# Patient Record
Sex: FEMALE | Race: OTHER | HISPANIC OR LATINO | URBAN - METROPOLITAN AREA
[De-identification: names, ages, dates, MRNs, and addresses within clinical notes are randomized per-mention and may not be internally consistent; named-entity substitution may affect disease eponyms.]

---

## 2019-02-09 ENCOUNTER — EMERGENCY (EMERGENCY)
Facility: HOSPITAL | Age: 24
LOS: 1 days | Discharge: HOME | End: 2019-02-09
Attending: EMERGENCY MEDICINE | Admitting: EMERGENCY MEDICINE
Payer: MEDICAID

## 2019-02-09 VITALS
RESPIRATION RATE: 15 BRPM | OXYGEN SATURATION: 100 % | TEMPERATURE: 98 F | SYSTOLIC BLOOD PRESSURE: 106 MMHG | DIASTOLIC BLOOD PRESSURE: 59 MMHG | HEART RATE: 55 BPM

## 2019-02-09 VITALS
RESPIRATION RATE: 16 BRPM | DIASTOLIC BLOOD PRESSURE: 59 MMHG | OXYGEN SATURATION: 100 % | TEMPERATURE: 98 F | SYSTOLIC BLOOD PRESSURE: 110 MMHG | HEART RATE: 66 BPM

## 2019-02-09 PROCEDURE — 99284 EMERGENCY DEPT VISIT MOD MDM: CPT

## 2019-02-09 PROCEDURE — 70450 CT HEAD/BRAIN W/O DYE: CPT | Mod: 26

## 2019-02-09 RX ORDER — ACETAMINOPHEN 500 MG
975 TABLET ORAL ONCE
Qty: 0 | Refills: 0 | Status: COMPLETED | OUTPATIENT
Start: 2019-02-09 | End: 2019-02-09

## 2019-02-09 RX ORDER — METOCLOPRAMIDE HCL 10 MG
10 TABLET ORAL ONCE
Qty: 0 | Refills: 0 | Status: COMPLETED | OUTPATIENT
Start: 2019-02-09 | End: 2019-02-09

## 2019-02-09 RX ORDER — IBUPROFEN 200 MG
400 TABLET ORAL ONCE
Qty: 0 | Refills: 0 | Status: COMPLETED | OUTPATIENT
Start: 2019-02-09 | End: 2019-02-09

## 2019-02-09 RX ORDER — PROCHLORPERAZINE MALEATE 5 MG
1 TABLET ORAL
Qty: 21 | Refills: 0
Start: 2019-02-09 | End: 2019-02-15

## 2019-02-09 RX ORDER — PROCHLORPERAZINE MALEATE 5 MG
1 TABLET ORAL
Qty: 21 | Refills: 0 | OUTPATIENT
Start: 2019-02-09 | End: 2019-02-15

## 2019-02-09 RX ADMIN — Medication 400 MILLIGRAM(S): at 14:14

## 2019-02-09 RX ADMIN — Medication 975 MILLIGRAM(S): at 12:32

## 2019-02-09 RX ADMIN — Medication 10 MILLIGRAM(S): at 12:32

## 2019-02-09 NOTE — ED ADULT TRIAGE NOTE - CHIEF COMPLAINT QUOTE
Headache with loss of balance x 2 weeks.  Sent for further evaluation by PMD who prescribed Butalbit-Acetaminophen, caffeine w/o relief

## 2019-02-09 NOTE — ED PROVIDER NOTE - NSFOLLOWUPCLINICS_GEN_ALL_ED_FT
[facility]  Neurology  [address] [place] [address] [address]rd [place] [address]  Phone: [phone]  Fax:   Follow Up Time:

## 2019-02-09 NOTE — ED PROVIDER NOTE - PROGRESS NOTE DETAILS
Pt has normal gait, tolerating po, well appearing, will dc to follow up with neuro. Precautions reviewed.

## 2019-02-09 NOTE — ED PROVIDER NOTE - MEDICAL DECISION MAKING DETAILS
24 y/o F with no significant PMHx presents to ED complaining of nausea and headache since 3 weeks ago. Plan: provide medications and obtain CT head.

## 2019-02-09 NOTE — ED PROVIDER NOTE - NEUROLOGICAL, MLM
Verified Results  PAP SMEAR, THIN PREP [id number] 01:00PM LASHONDA BAE     Test Name Result Flag Reference   GYN PAP SMEAR, THIN PREP (Report) O    Name: STORM JEONG      MRN:   [id number]   : 1971           Visit#: [id number]                Gynecologic Cytology Consultation Report      Client:  [facility]      Date Specimen Collected: 17      Accession #: [id number]   Date Specimen Received: 17      Requisition   #:[id number]   Date Reported:      2017 10:31  Location:   [facility]      ______________________________________________________________________________   Cytologic Interpretation :      Negative for intraepithelial lesion or malignancy.       Satisfactory for evaluation. Presence of endocervical/transformation zone   component.         CRYSTAL Helton(ASCP)   ** Electronic Signature (LSY) 2017  10:31 **      Educational note: The Pap test is a screening test with a well-recognized   false negative rate. The best means available to lower the false negative   rate and to detect early cervical lesions is a Pap test at regular intervals.    All ThinPrep Paps will be reviewed with the aid of the ThinPrep Imaging   System, unless otherwise specified.      ______________________________________________________________________________   Clinical Information:   Menstrual Hx: No Menstrual History Provided   Other Clinical Conditions:Pap source: Endocervical   Specimen(s) Submitted:    Thin Prep Pap Test      ICD Codes:    Z12.4      Fee Codes:    A: [id number]      Performing Lab Location (Unless otherwise specified):   [address] [address]        Alert and oriented, no focal deficits, no motor or sensory deficits.

## 2019-02-09 NOTE — ED PROVIDER NOTE - OBJECTIVE STATEMENT
24 y/o F with no significant PMHx presents to ED complaining of nausea and headache since 3 weeks ago. Pt states the headache initially began 1 year ago. Pt was seen by her PMD and prescribed medications which have not resolved the headache. The doctor stated this may be tension headaches. Pt states she has no chance of being pregnant. Pt states she takes Advil with no relief. Pt denies any stress, vomiting, diarrhea, fever, chills or any other medical problems.

## 2019-09-30 ENCOUNTER — EMERGENCY (EMERGENCY)
Facility: HOSPITAL | Age: 24
LOS: 1 days | Discharge: HOME | End: 2019-09-30
Attending: INTERNAL MEDICINE | Admitting: INTERNAL MEDICINE
Payer: MEDICAID

## 2019-09-30 VITALS
DIASTOLIC BLOOD PRESSURE: 62 MMHG | TEMPERATURE: 99 F | OXYGEN SATURATION: 100 % | HEART RATE: 67 BPM | SYSTOLIC BLOOD PRESSURE: 104 MMHG | RESPIRATION RATE: 16 BRPM

## 2019-09-30 LAB
ALBUMIN SERPL ELPH-MCNC: 4.5 G/DL — SIGNIFICANT CHANGE UP (ref 3.3–5)
ALP SERPL-CCNC: 51 U/L — SIGNIFICANT CHANGE UP (ref 40–120)
ALT FLD-CCNC: 8 U/L — SIGNIFICANT CHANGE UP (ref 4–33)
ANION GAP SERPL CALC-SCNC: 8 MMO/L — SIGNIFICANT CHANGE UP (ref 7–14)
APPEARANCE UR: CLEAR — SIGNIFICANT CHANGE UP
APTT BLD: 32.3 SEC — SIGNIFICANT CHANGE UP (ref 27.5–36.3)
AST SERPL-CCNC: 10 U/L — SIGNIFICANT CHANGE UP (ref 4–32)
BACTERIA # UR AUTO: NEGATIVE — SIGNIFICANT CHANGE UP
BASE EXCESS BLDV CALC-SCNC: 0.2 MMOL/L — SIGNIFICANT CHANGE UP
BASOPHILS # BLD AUTO: 0.05 K/UL — SIGNIFICANT CHANGE UP (ref 0–0.2)
BASOPHILS NFR BLD AUTO: 0.5 % — SIGNIFICANT CHANGE UP (ref 0–2)
BILIRUB SERPL-MCNC: 0.3 MG/DL — SIGNIFICANT CHANGE UP (ref 0.2–1.2)
BILIRUB UR-MCNC: NEGATIVE — SIGNIFICANT CHANGE UP
BLOOD GAS VENOUS - CREATININE: 0.66 MG/DL — SIGNIFICANT CHANGE UP (ref 0.5–1.3)
BLOOD UR QL VISUAL: NEGATIVE — SIGNIFICANT CHANGE UP
BUN SERPL-MCNC: 11 MG/DL — SIGNIFICANT CHANGE UP (ref 7–23)
CALCIUM SERPL-MCNC: 9.3 MG/DL — SIGNIFICANT CHANGE UP (ref 8.4–10.5)
CHLORIDE BLDV-SCNC: 107 MMOL/L — SIGNIFICANT CHANGE UP (ref 96–108)
CHLORIDE SERPL-SCNC: 104 MMOL/L — SIGNIFICANT CHANGE UP (ref 98–107)
CO2 SERPL-SCNC: 25 MMOL/L — SIGNIFICANT CHANGE UP (ref 22–31)
COLOR SPEC: COLORLESS — SIGNIFICANT CHANGE UP
CREAT SERPL-MCNC: 0.67 MG/DL — SIGNIFICANT CHANGE UP (ref 0.5–1.3)
EOSINOPHIL # BLD AUTO: 0.33 K/UL — SIGNIFICANT CHANGE UP (ref 0–0.5)
EOSINOPHIL NFR BLD AUTO: 3.5 % — SIGNIFICANT CHANGE UP (ref 0–6)
GAS PNL BLDV: 137 MMOL/L — SIGNIFICANT CHANGE UP (ref 136–146)
GLUCOSE BLDV-MCNC: 97 MG/DL — SIGNIFICANT CHANGE UP (ref 70–99)
GLUCOSE SERPL-MCNC: 96 MG/DL — SIGNIFICANT CHANGE UP (ref 70–99)
GLUCOSE UR-MCNC: NEGATIVE — SIGNIFICANT CHANGE UP
HCG UR-SCNC: NEGATIVE — SIGNIFICANT CHANGE UP
HCO3 BLDV-SCNC: 22 MMOL/L — SIGNIFICANT CHANGE UP (ref 20–27)
HCT VFR BLD CALC: 40.4 % — SIGNIFICANT CHANGE UP (ref 34.5–45)
HCT VFR BLDV CALC: 41.5 % — SIGNIFICANT CHANGE UP (ref 34.5–45)
HGB BLD-MCNC: 13.4 G/DL — SIGNIFICANT CHANGE UP (ref 11.5–15.5)
HGB BLDV-MCNC: 13.5 G/DL — SIGNIFICANT CHANGE UP (ref 11.5–15.5)
HYALINE CASTS # UR AUTO: NEGATIVE — SIGNIFICANT CHANGE UP
IMM GRANULOCYTES NFR BLD AUTO: 0.3 % — SIGNIFICANT CHANGE UP (ref 0–1.5)
INR BLD: 1.16 — SIGNIFICANT CHANGE UP (ref 0.88–1.17)
KETONES UR-MCNC: NEGATIVE — SIGNIFICANT CHANGE UP
LACTATE BLDV-MCNC: 1.8 MMOL/L — SIGNIFICANT CHANGE UP (ref 0.5–2)
LEUKOCYTE ESTERASE UR-ACNC: SIGNIFICANT CHANGE UP
LIDOCAIN IGE QN: 52.8 U/L — SIGNIFICANT CHANGE UP (ref 7–60)
LYMPHOCYTES # BLD AUTO: 1.76 K/UL — SIGNIFICANT CHANGE UP (ref 1–3.3)
LYMPHOCYTES # BLD AUTO: 18.9 % — SIGNIFICANT CHANGE UP (ref 13–44)
MCHC RBC-ENTMCNC: 31.4 PG — SIGNIFICANT CHANGE UP (ref 27–34)
MCHC RBC-ENTMCNC: 33.2 % — SIGNIFICANT CHANGE UP (ref 32–36)
MCV RBC AUTO: 94.6 FL — SIGNIFICANT CHANGE UP (ref 80–100)
MONOCYTES # BLD AUTO: 0.89 K/UL — SIGNIFICANT CHANGE UP (ref 0–0.9)
MONOCYTES NFR BLD AUTO: 9.6 % — SIGNIFICANT CHANGE UP (ref 2–14)
NEUTROPHILS # BLD AUTO: 6.24 K/UL — SIGNIFICANT CHANGE UP (ref 1.8–7.4)
NEUTROPHILS NFR BLD AUTO: 67.2 % — SIGNIFICANT CHANGE UP (ref 43–77)
NITRITE UR-MCNC: NEGATIVE — SIGNIFICANT CHANGE UP
NRBC # FLD: 0 K/UL — SIGNIFICANT CHANGE UP (ref 0–0)
PCO2 BLDV: 53 MMHG — HIGH (ref 41–51)
PH BLDV: 7.31 PH — LOW (ref 7.32–7.43)
PH UR: 7 — SIGNIFICANT CHANGE UP (ref 5–8)
PLATELET # BLD AUTO: 339 K/UL — SIGNIFICANT CHANGE UP (ref 150–400)
PMV BLD: 8.7 FL — SIGNIFICANT CHANGE UP (ref 7–13)
PO2 BLDV: < 24 MMHG — LOW (ref 35–40)
POTASSIUM BLDV-SCNC: 4.6 MMOL/L — HIGH (ref 3.4–4.5)
POTASSIUM SERPL-MCNC: 4.6 MMOL/L — SIGNIFICANT CHANGE UP (ref 3.5–5.3)
POTASSIUM SERPL-SCNC: 4.6 MMOL/L — SIGNIFICANT CHANGE UP (ref 3.5–5.3)
PROT SERPL-MCNC: 7.2 G/DL — SIGNIFICANT CHANGE UP (ref 6–8.3)
PROT UR-MCNC: NEGATIVE — SIGNIFICANT CHANGE UP
PROTHROM AB SERPL-ACNC: 12.9 SEC — SIGNIFICANT CHANGE UP (ref 9.8–13.1)
RBC # BLD: 4.27 M/UL — SIGNIFICANT CHANGE UP (ref 3.8–5.2)
RBC # FLD: 13.1 % — SIGNIFICANT CHANGE UP (ref 10.3–14.5)
RBC CASTS # UR COMP ASSIST: SIGNIFICANT CHANGE UP (ref 0–?)
SAO2 % BLDV: 16.1 % — LOW (ref 60–85)
SODIUM SERPL-SCNC: 137 MMOL/L — SIGNIFICANT CHANGE UP (ref 135–145)
SP GR SPEC: 1.01 — SIGNIFICANT CHANGE UP (ref 1–1.04)
SQUAMOUS # UR AUTO: SIGNIFICANT CHANGE UP
UROBILINOGEN FLD QL: NORMAL — SIGNIFICANT CHANGE UP
WBC # BLD: 9.3 K/UL — SIGNIFICANT CHANGE UP (ref 3.8–10.5)
WBC # FLD AUTO: 9.3 K/UL — SIGNIFICANT CHANGE UP (ref 3.8–10.5)
WBC UR QL: HIGH (ref 0–?)

## 2019-09-30 PROCEDURE — 99283 EMERGENCY DEPT VISIT LOW MDM: CPT

## 2019-09-30 RX ORDER — FAMOTIDINE 10 MG/ML
20 INJECTION INTRAVENOUS DAILY
Refills: 0 | Status: DISCONTINUED | OUTPATIENT
Start: 2019-09-30 | End: 2019-10-06

## 2019-09-30 RX ORDER — ACETAMINOPHEN 500 MG
650 TABLET ORAL ONCE
Refills: 0 | Status: COMPLETED | OUTPATIENT
Start: 2019-09-30 | End: 2019-09-30

## 2019-09-30 RX ADMIN — Medication 30 MILLILITER(S): at 17:47

## 2019-09-30 RX ADMIN — FAMOTIDINE 20 MILLIGRAM(S): 10 INJECTION INTRAVENOUS at 17:47

## 2019-09-30 RX ADMIN — Medication 650 MILLIGRAM(S): at 17:36

## 2019-09-30 NOTE — ED PROVIDER NOTE - PHYSICAL EXAMINATION
GEN: Seated in Stretcher, Awake, Alert, Conversant  HEENT: PERRL, normal sclera, moist oral mucosa  PULM: Breath sounds bilaterally, no increased work of breathing, speaking full sentences  CV: Normal and regular heart rate, normal S1/S2, no murmurs, rubs, or gallops  Abd: Soft and nontender, no rebound or guarding.  No lower quadrant or suprapubic tenderness, no CVAT.  No Rhomberg or Rovsing's sign present.  Ext: No lower extremity edema or erythema, nontender  Derm: No rashes  Neuro: Alert and oriented x3, clear and fluent speech, moving all extremities with good strength

## 2019-09-30 NOTE — ED PROVIDER NOTE - PATIENT PORTAL LINK FT
You can access the FollowMyHealth Patient Portal offered by [facility] by registering at the following website: [email or web address]. By joining [employer]’s FollowMyHealth portal, you will also be able to view your health information using other applications (apps) compatible with our system.

## 2019-09-30 NOTE — ED PROVIDER NOTE - NSFOLLOWUPINSTRUCTIONS_ED_ALL_ED_FT
Please follow up with your primary medical doctor within two days.  Return to the emergency department immediately if you develop worsening pain, nausea, vomiting or fever, or if you feel your condition to be worsening.

## 2019-09-30 NOTE — ED PROVIDER NOTE - PROGRESS NOTE DETAILS
Pain resolved S/P maalox.  Repeat abdominal exam benign.  Patient tolerates Po well.  Stable for discharge to outpatient care.  Return precautions discussed.

## 2019-09-30 NOTE — ED PROVIDER NOTE - CLINICAL SUMMARY MEDICAL DECISION MAKING FREE TEXT BOX
Dr. Desai:  23F denies PM P/W RUQ and epigastric pain.  Well appearing with benign exam.  Denies RLQ documented in triage.  No vaginal complaints.  Possible GERD.  Low suspicion for pancreatitis given benign exam.  Doubt cholecystitis given lack of nausea and fever.  Plan for labs, pepcid/maalox, re-eval.

## 2020-01-21 ENCOUNTER — EMERGENCY (EMERGENCY)
Facility: HOSPITAL | Age: 25
LOS: 1 days | Discharge: HOME | End: 2020-01-21
Attending: PERSONAL EMERGENCY RESPONSE ATTENDANT | Admitting: SPECIALIST
Payer: MEDICAID

## 2020-01-21 VITALS
TEMPERATURE: 98 F | HEART RATE: 60 BPM | RESPIRATION RATE: 17 BRPM | DIASTOLIC BLOOD PRESSURE: 41 MMHG | OXYGEN SATURATION: 100 % | SYSTOLIC BLOOD PRESSURE: 104 MMHG

## 2020-01-21 LAB
ALBUMIN SERPL ELPH-MCNC: 4.8 G/DL — SIGNIFICANT CHANGE UP (ref 3.3–5)
ALP SERPL-CCNC: 48 U/L — SIGNIFICANT CHANGE UP (ref 40–120)
ALT FLD-CCNC: 10 U/L — SIGNIFICANT CHANGE UP (ref 4–33)
ANION GAP SERPL CALC-SCNC: 11 MMO/L — SIGNIFICANT CHANGE UP (ref 7–14)
APPEARANCE UR: CLEAR — SIGNIFICANT CHANGE UP
AST SERPL-CCNC: 12 U/L — SIGNIFICANT CHANGE UP (ref 4–32)
BACTERIA # UR AUTO: SIGNIFICANT CHANGE UP
BASOPHILS # BLD AUTO: 0.05 K/UL — SIGNIFICANT CHANGE UP (ref 0–0.2)
BASOPHILS NFR BLD AUTO: 0.6 % — SIGNIFICANT CHANGE UP (ref 0–2)
BILIRUB SERPL-MCNC: 0.2 MG/DL — SIGNIFICANT CHANGE UP (ref 0.2–1.2)
BILIRUB UR-MCNC: NEGATIVE — SIGNIFICANT CHANGE UP
BLOOD UR QL VISUAL: NEGATIVE — SIGNIFICANT CHANGE UP
BUN SERPL-MCNC: 9 MG/DL — SIGNIFICANT CHANGE UP (ref 7–23)
CALCIUM SERPL-MCNC: 9.5 MG/DL — SIGNIFICANT CHANGE UP (ref 8.4–10.5)
CHLORIDE SERPL-SCNC: 104 MMOL/L — SIGNIFICANT CHANGE UP (ref 98–107)
CO2 SERPL-SCNC: 23 MMOL/L — SIGNIFICANT CHANGE UP (ref 22–31)
COLOR SPEC: COLORLESS — SIGNIFICANT CHANGE UP
CREAT SERPL-MCNC: 0.64 MG/DL — SIGNIFICANT CHANGE UP (ref 0.5–1.3)
EOSINOPHIL # BLD AUTO: 0.32 K/UL — SIGNIFICANT CHANGE UP (ref 0–0.5)
EOSINOPHIL NFR BLD AUTO: 3.7 % — SIGNIFICANT CHANGE UP (ref 0–6)
GLUCOSE SERPL-MCNC: 93 MG/DL — SIGNIFICANT CHANGE UP (ref 70–99)
GLUCOSE UR-MCNC: NEGATIVE — SIGNIFICANT CHANGE UP
HCT VFR BLD CALC: 40 % — SIGNIFICANT CHANGE UP (ref 34.5–45)
HGB BLD-MCNC: 13.5 G/DL — SIGNIFICANT CHANGE UP (ref 11.5–15.5)
HYALINE CASTS # UR AUTO: NEGATIVE — SIGNIFICANT CHANGE UP
IMM GRANULOCYTES NFR BLD AUTO: 0.3 % — SIGNIFICANT CHANGE UP (ref 0–1.5)
KETONES UR-MCNC: NEGATIVE — SIGNIFICANT CHANGE UP
LEUKOCYTE ESTERASE UR-ACNC: SIGNIFICANT CHANGE UP
LYMPHOCYTES # BLD AUTO: 1.63 K/UL — SIGNIFICANT CHANGE UP (ref 1–3.3)
LYMPHOCYTES # BLD AUTO: 18.7 % — SIGNIFICANT CHANGE UP (ref 13–44)
MCHC RBC-ENTMCNC: 31.5 PG — SIGNIFICANT CHANGE UP (ref 27–34)
MCHC RBC-ENTMCNC: 33.8 % — SIGNIFICANT CHANGE UP (ref 32–36)
MCV RBC AUTO: 93.2 FL — SIGNIFICANT CHANGE UP (ref 80–100)
MONOCYTES # BLD AUTO: 0.71 K/UL — SIGNIFICANT CHANGE UP (ref 0–0.9)
MONOCYTES NFR BLD AUTO: 8.1 % — SIGNIFICANT CHANGE UP (ref 2–14)
NEUTROPHILS # BLD AUTO: 5.99 K/UL — SIGNIFICANT CHANGE UP (ref 1.8–7.4)
NEUTROPHILS NFR BLD AUTO: 68.6 % — SIGNIFICANT CHANGE UP (ref 43–77)
NITRITE UR-MCNC: NEGATIVE — SIGNIFICANT CHANGE UP
NRBC # FLD: 0 K/UL — SIGNIFICANT CHANGE UP (ref 0–0)
PH UR: 6 — SIGNIFICANT CHANGE UP (ref 5–8)
PLATELET # BLD AUTO: 368 K/UL — SIGNIFICANT CHANGE UP (ref 150–400)
PMV BLD: 8.9 FL — SIGNIFICANT CHANGE UP (ref 7–13)
POTASSIUM SERPL-MCNC: 4.2 MMOL/L — SIGNIFICANT CHANGE UP (ref 3.5–5.3)
POTASSIUM SERPL-SCNC: 4.2 MMOL/L — SIGNIFICANT CHANGE UP (ref 3.5–5.3)
PROT SERPL-MCNC: 7.5 G/DL — SIGNIFICANT CHANGE UP (ref 6–8.3)
PROT UR-MCNC: NEGATIVE — SIGNIFICANT CHANGE UP
RBC # BLD: 4.29 M/UL — SIGNIFICANT CHANGE UP (ref 3.8–5.2)
RBC # FLD: 12.9 % — SIGNIFICANT CHANGE UP (ref 10.3–14.5)
RBC CASTS # UR COMP ASSIST: SIGNIFICANT CHANGE UP (ref 0–?)
SODIUM SERPL-SCNC: 138 MMOL/L — SIGNIFICANT CHANGE UP (ref 135–145)
SP GR SPEC: 1.01 — SIGNIFICANT CHANGE UP (ref 1–1.04)
SQUAMOUS # UR AUTO: SIGNIFICANT CHANGE UP
UROBILINOGEN FLD QL: NORMAL — SIGNIFICANT CHANGE UP
WBC # BLD: 8.73 K/UL — SIGNIFICANT CHANGE UP (ref 3.8–10.5)
WBC # FLD AUTO: 8.73 K/UL — SIGNIFICANT CHANGE UP (ref 3.8–10.5)
WBC UR QL: HIGH (ref 0–?)

## 2020-01-21 PROCEDURE — 99285 EMERGENCY DEPT VISIT HI MDM: CPT

## 2020-01-21 RX ORDER — ACETAMINOPHEN 500 MG
650 TABLET ORAL ONCE
Refills: 0 | Status: COMPLETED | OUTPATIENT
Start: 2020-01-21 | End: 2020-01-21

## 2020-01-21 RX ORDER — SODIUM CHLORIDE 9 MG/ML
1000 INJECTION, SOLUTION INTRAVENOUS
Refills: 0 | Status: DISCONTINUED | OUTPATIENT
Start: 2020-01-21 | End: 2020-01-22

## 2020-01-21 RX ORDER — CEFOTETAN DISODIUM 1 G
2 VIAL (EA) INJECTION ONCE
Refills: 0 | Status: DISCONTINUED | OUTPATIENT
Start: 2020-01-21 | End: 2020-01-21

## 2020-01-21 RX ORDER — SODIUM CHLORIDE 9 MG/ML
1000 INJECTION INTRAMUSCULAR; INTRAVENOUS; SUBCUTANEOUS ONCE
Refills: 0 | Status: COMPLETED | OUTPATIENT
Start: 2020-01-21 | End: 2020-01-21

## 2020-01-21 RX ADMIN — SODIUM CHLORIDE 2000 MILLILITER(S): 9 INJECTION INTRAMUSCULAR; INTRAVENOUS; SUBCUTANEOUS at 17:52

## 2020-01-21 RX ADMIN — Medication 650 MILLIGRAM(S): at 18:21

## 2020-01-21 RX ADMIN — Medication 650 MILLIGRAM(S): at 17:51

## 2020-01-21 RX ADMIN — SODIUM CHLORIDE 1000 MILLILITER(S): 9 INJECTION INTRAMUSCULAR; INTRAVENOUS; SUBCUTANEOUS at 19:00

## 2020-01-21 NOTE — ED PROVIDER NOTE - ABDOMINAL EXAM
RLQ TTP without guarding, R lumbar triangle tender to percussion, medial R thigh ttp without palpable masses/cording or focus of ttp

## 2020-01-21 NOTE — ED ADULT NURSE NOTE - OBJECTIVE STATEMENT
Patient presents for RLQ pain worse after eating and tender on palpation x 5 days. Denies fevers/chills/dysuria/bleeding/discharge. MD evaluated plan is for CT abdomen to r/o appendicitis. PO contrast given. IV placed RT arm #20g. Denies med hx. LMP 1/5/2020

## 2020-01-21 NOTE — ED PROVIDER NOTE - GENITOURINARY BLADDER
Narrow introitus with discomfort on exam, bimanual performed,  R adnexal TTP on pelvic exam, no chandelier sign, no L adnexal ttp, no discharge noted on exam, no palpable masses

## 2020-01-21 NOTE — H&P ADULT - NSHPPHYSICALEXAM_GEN_ALL_CORE
General: A&Ox3, NAD.  Neuro: CN II-XII intact  HEENT: Normocephalic, atraumatic  Respiratory: unlabored breathing.   CVS: bradycardic  Abdomen: Soft, non-distended, mild tenderness in RLQ.  No rebound tenderness, no guarding.  Extremities: Warm bilaterally.  MSK: Intact ROM.

## 2020-01-21 NOTE — H&P ADULT - NSHPLABSRESULTS_GEN_ALL_CORE
CBC ( @ 17:50)                          13.5                     8.73    )--------------(  368        68.6  % Neuts, 18.7  % Lymphs, ANC: 5.99                            40.0      BMP ( @ 17:50)       138     |  104     |  9     			Ca++ --      Ca 9.5          ---------------------------------( 93    		Mg --           4.2     |  23      |  0.64  			Ph --        LFTs ( @ 17:50)      TPro 7.5 / Alb 4.8 / TBili 0.2 / DBili -- / AST 12 / ALT 10 / AlkPhos 48            Urinalysis ( @ 17:40):     Color: COLORLESS / Appearance: CLEAR / S.007 / pH: 6.0 / Gluc: NEGATIVE / Ketones: NEGATIVE / Bili: NEGATIVE / Urobili: NORMAL / Protein :NEGATIVE / Nitrites: NEGATIVE / Leuk.Est: LARGE / RBC: 0-2 / WBC: 26-50<H> / Sq Epi: OCC / Non Sq Epi:  / Bacteria FEW         IMAGING:  CT A/P:    IMPRESSION -      IMPRESSION:     Appendiceal tip is mildly thickened and does not fill with oral contrast material. Suggestion of trace periappendiceal inflammation. Findings concerning for early tip appendicitis.

## 2020-01-21 NOTE — H&P ADULT - ASSESSMENT
Patient is a 24 year old female with no PMH or PSH that has tip appendicitis.    -NPO  -IVFs for hydration  -Consent in front of chart  -OR for lap appy  -abx coverage with cefotetan  -Lovenox for VTE px    Lucian Puri PGY3  B Team Surgery #[id number] ASSESSMENT:  Patient is a 24 year old female with no PMH or PSH who presents with concern for possible tip appendicitis.    PLAN:  - Admit to B team surgery under Dr. Weeks  - Will monitor patient overnight off of antibiotics  - NPO  - IVF   - No standing pain meds  - May plan for lap appy if patient declines overnight    Lucian Puri PGY3  B Team Surgery #[id number]

## 2020-01-21 NOTE — ED ADULT NURSE REASSESSMENT NOTE - NS ED NURSE REASSESS COMMENT FT1
pt A&Ox4, admitted to surgery. labs sent. NPO status reenforced. patient on phone with parents discussing plan of care. 20G IV noted to right AC, flushes well with positive blood return.

## 2020-01-21 NOTE — H&P ADULT - ATTENDING COMMENTS
Above noted. The patient was seen in the ER yesterday. History obtained, the patient was examined. Four day history of abdominal pain, radiating to the back and down her right leg. She has chronic right inguinal/ abdominal pain with her menstrual cycles, never investigated.  When seen in the ER yesterday, she admitted the pain was less intense than on presentation.   CT Scan: Consistent with tip appendicitis.  She was admitted, kept NPO and monitored closely overnight.

## 2020-01-21 NOTE — ED PROVIDER NOTE - OBJECTIVE STATEMENT
Attending MD Mckeon.  Pt is an otherwise healthy 23 yo female with LMP 1/5 who presents to Ed with 3 days RLQ pain.  Pt states she had sharp significant pain 3 days ago and has had burning and pain in RLQ that now radiates to R lumbar triangle and R medial thigh since that time.  Denies fevers but endorses some chills.  Endorses vague nausea but not vomiting/diarrhea.  States she had something that may have been similar sev'l yrs ago but was evaluated for ovarian pathology at that time and had no findings.  She endorses sexual activity with intravaginal intercourse last 2 mos ago.  Pt denies vaginal discharge/bleeding during this pain.  No urinary sxs.  no hx of abdominal or pelvic surgeries.  Pt well appearing in NAD but has discomfort to RLQ when she moves or moves her RLE.

## 2020-01-21 NOTE — H&P ADULT - HISTORY OF PRESENT ILLNESS
Patient is a 24 year old female with no PMH or PSH that is presenting to the ED with 3 days of abdominal pain.  The pain started in the RLQ and has remained there.  It radiates to her right back.  She denies having any nausea, vomiting, fevers, chills, or burning with urination.  She has been having regular bowel movements and still has flatus.  Patient states that she had similar pain about three years ago, but was no diagnosis was determined.  Of note, patient also states that she notices a bulge in her right groin that comes and goes with standing and eating.  However, she currently has no bulge in right groin.

## 2020-01-21 NOTE — ED PROVIDER NOTE - CLINICAL SUMMARY MEDICAL DECISION MAKING FREE TEXT BOX
Attending MD Mckeon.  Pt is well appearing and has RLQ and R adnexal TTP.  Concern for ovarian cyst/rupture 2.2 initial sharp pain and now burning with R lumbar triangle and r medial thigh involvement poss c/w psoas irritation.  Will also eval for appy.  hemodynamically stable. afebrile.

## 2020-01-21 NOTE — ED ADULT NURSE REASSESSMENT NOTE - CONDITION
unchanged Ear Star Wedge Flap Text: The defect edges were debeveled with a #15 blade scalpel.  Given the location of the defect and the proximity to free margins (helical rim) an ear star wedge flap was deemed most appropriate.  Using a sterile surgical marker, the appropriate flap was drawn incorporating the defect and placing the expected incisions between the helical rim and antihelix where possible.  The area thus outlined was incised through and through with a #15 scalpel blade.

## 2020-01-21 NOTE — ED ADULT NURSE NOTE - NSFALLRSKASSESASSIST_ED_ALL_ED
Med: Hydrocodone  Dosage:  MG  Sig:    Quantity requested:  75    Med: Morphine SR  Dosage: 15 MG  Sig:    Quantity requested:  30    Mail Order: no    Preferred pharmacy has been set up and verified.  [place] [phone]   no

## 2020-01-21 NOTE — ED ADULT NURSE REASSESSMENT NOTE - NS ED NURSE REASSESS COMMENT FT1
pt returned from CT, endorses RLQ abdominal pain. states 3/10 is comfortable. awaiting results and disposition, advised to remain NPO.

## 2020-01-22 VITALS
RESPIRATION RATE: 17 BRPM | OXYGEN SATURATION: 100 % | SYSTOLIC BLOOD PRESSURE: 104 MMHG | HEART RATE: 63 BPM | DIASTOLIC BLOOD PRESSURE: 56 MMHG | TEMPERATURE: 97 F

## 2020-01-22 DIAGNOSIS — R10.31 RIGHT LOWER QUADRANT PAIN: ICD-10-CM

## 2020-01-22 LAB
APTT BLD: 31.6 SEC — SIGNIFICANT CHANGE UP (ref 27.5–36.3)
BLD GP AB SCN SERPL QL: NEGATIVE — SIGNIFICANT CHANGE UP
INR BLD: 1.11 — SIGNIFICANT CHANGE UP (ref 0.88–1.17)
PROTHROM AB SERPL-ACNC: 12.4 SEC — SIGNIFICANT CHANGE UP (ref 9.8–13.1)
RH IG SCN BLD-IMP: POSITIVE — SIGNIFICANT CHANGE UP

## 2020-01-22 RX ORDER — ENOXAPARIN SODIUM 100 MG/ML
40 INJECTION SUBCUTANEOUS DAILY
Refills: 0 | Status: DISCONTINUED | OUTPATIENT
Start: 2020-01-22 | End: 2020-01-22

## 2020-01-22 RX ORDER — INFLUENZA VIRUS VACCINE 15; 15; 15; 15 UG/.5ML; UG/.5ML; UG/.5ML; UG/.5ML
0.5 SUSPENSION INTRAMUSCULAR ONCE
Refills: 0 | Status: DISCONTINUED | OUTPATIENT
Start: 2020-01-22 | End: 2020-01-22

## 2020-01-22 RX ADMIN — SODIUM CHLORIDE 100 MILLILITER(S): 9 INJECTION, SOLUTION INTRAVENOUS at 00:36

## 2020-01-22 RX ADMIN — SODIUM CHLORIDE 50 MILLILITER(S): 9 INJECTION, SOLUTION INTRAVENOUS at 15:00

## 2020-01-22 NOTE — CHART NOTE - NSCHARTNOTEFT_GEN_A_CORE
CAPRINI SCORE [CLOT]    AGE RELATED RISK FACTORS                                                       MOBILITY RELATED FACTORS  [ ] Age 41-60 years                                            (1 Point)                  [ ] Bed rest                                                        (1 Point)  [ ] Age: 61-74 years                                           (2 Points)                 [ ] Plaster cast                                                   (2 Points)  [ ] Age= 75 years                                              (3 Points)                 [ ] Bed bound for more than 72 hours                 (2 Points)    DISEASE RELATED RISK FACTORS                                               GENDER SPECIFIC FACTORS  [ ] Edema in the lower extremities                       (1 Point)                  [ ] Pregnancy                                                     (1 Point)  [ ] Varicose veins                                               (1 Point)                  [ ] Post-partum < 6 weeks                                   (1 Point)             [ ] BMI > 25 Kg/m2                                            (1 Point)                  [ ] Hormonal therapy  or oral contraception          (1 Point)                 [ ] Sepsis (in the previous month)                        (1 Point)                  [ ] History of pregnancy complications                 (1 point)  [ ] Pneumonia or serious lung disease                                               [ ] Unexplained or recurrent                     (1 Point)           (in the previous month)                               (1 Point)  [ ] Abnormal pulmonary function test                     (1 Point)                 SURGERY RELATED RISK FACTORS  [ ] Acute myocardial infarction                              (1 Point)                 [ ]  Section                                             (1 Point)  [ ] Congestive heart failure (in the previous month)  (1 Point)               [ ] Minor surgery                                                  (1 Point)   [ ] Inflammatory bowel disease                             (1 Point)                 [ ] Arthroscopic surgery                                        (2 Points)  [ ] Central venous access                                      (2 Points)                [ ] General surgery lasting more than 45 minutes   (2 Points)       [ ] Stroke (in the previous month)                          (5 Points)               [ ] Elective arthroplasty                                         (5 Points)                                                                                                                                               HEMATOLOGY RELATED FACTORS                                                 TRAUMA RELATED RISK FACTORS  [ ] Prior episodes of VTE                                     (3 Points)                [ ] Fracture of the hip, pelvis, or leg                       (5 Points)  [ ] Positive family history for VTE                         (3 Points)                 [ ] Acute spinal cord injury (in the previous month)  (5 Points)  [ ] Prothrombin 80364 A                                     (3 Points)                 [ ] Paralysis  (less than 1 month)                             (5 Points)  [ ] Factor V Leiden                                             (3 Points)                  [ ] Multiple Trauma within 1 month                        (5 Points)  [ ] Lupus anticoagulants                                     (3 Points)                                                           [ ] Anticardiolipin antibodies                               (3 Points)                                                       [ ] High homocysteine in the blood                      (3 Points)                                             [ ] Other congenital or acquired thrombophilia      (3 Points)                                                [ ] Heparin induced thrombocytopenia                  (3 Points)                                          Total Score [    0    ]    Caprini Score 0 - 2:  Low Risk, No VTE Prophylaxis required for most patients, encourage ambulation  Caprini Score 3 - 6:  At Risk, pharmacologic VTE prophylaxis is indicated for most patients (in the absence of a contraindication)  Caprini Score Greater than or = 7:  High Risk, pharmacologic VTE prophylaxis is indicated for most patients (in the absence of a contraindication)

## 2020-01-22 NOTE — DISCHARGE NOTE NURSING/CASE MANAGEMENT/SOCIAL WORK - PATIENT PORTAL LINK FT
You can access the FollowMyHealth Patient Portal offered by [facility] by registering at the following website: [email or web address]. By joining [employer]’s FollowMyHealth portal, you will also be able to view your health information using other applications (apps) compatible with our system.

## 2020-01-22 NOTE — PROGRESS NOTE ADULT - ATTENDING COMMENTS
Above noted. Denies nausea or pain, tolerating oral intake.   Physical Exam: Abdomen: Soft, flat, non-tender.  Labs: WBC is normal.  Plan: Discharge with instructions to return to the ER if pain recurs. Also recommended she should have a GYN consultation.

## 2020-01-22 NOTE — DISCHARGE NOTE PROVIDER - NSDCCPCAREPLAN_GEN_ALL_CORE_FT
PRINCIPAL DISCHARGE DIAGNOSIS  Diagnosis: Right lower quadrant abdominal pain  Assessment and Plan of Treatment: WOUND CARE:   BATHING: Please do not submerge wound underwater. You may shower and/or sponge bathe.  ACTIVITY: Resume activities of daily living.  DIET: Return to your usual diet.  NOTIFY YOUR SURGEON IF: You have any bleeding that does not stop,  any fever (over 100.4 F) or chills, persistent nausea/vomiting, persistent diarrhea, or if your pain is not controlled on your discharge pain medications.  FOLLOW-UP:  1. Please call to make a follow-up appointment within two weeks of discharge with Dr. Weeks  2. Please follow up with your primary care physician in one week regarding your hospitalization.

## 2020-01-22 NOTE — PROGRESS NOTE ADULT - SUBJECTIVE AND OBJECTIVE BOX
B TEAM SURGERY PROGRESS NOTE    SUBJECTIVE: Patient seen and examined at bedside on AM rounds, complaining of right pelvic/lower abdomen pain. Pain improving, passing flauts without bowel movement today. Denies nausea/vomiting. Denies chest pain/SOB.     Vital Signs Last 24 Hrs  T(C): 36.7 (2020 07:48), Max: 36.8 (2020 20:31)  T(F): 98.1 (2020 07:48), Max: 98.3 (2020 20:31)  HR: 60 (2020 07:48) (54 - 63)  BP: 101/63 (2020 07:48) (89/45 - 104/41)  BP(mean): --  RR: 16 (2020 07:48) (16 - 18)  SpO2: 99% (2020 07:48) (98% - 100%)  I&O's Detail    MEDICATIONS  (STANDING):  enoxaparin Injectable 40 milliGRAM(s) SubCutaneous daily  influenza   Vaccine 0.5 milliLiter(s) IntraMuscular once  lactated ringers. 1000 milliLiter(s) (100 mL/Hr) IV Continuous <Continuous>    MEDICATIONS  (PRN):    Physical Exam  General: A&Ox3, NAD  Respiratory: Clear bilaterally, equal bilateral expansion  Cardiovascular: Regular rate & rhythm  Abdominal: Right pelvic pain, no rebound or guarding, soft, non-distended    LABS:                        13.5   8.73  )-----------( 368      ( 2020 17:50 )             40.0     01-    138  |  104  |  9   ----------------------------<  93  4.2   |  23  |  0.64    Ca    9.5      2020 17:50    TPro  7.5  /  Alb  4.8  /  TBili  0.2  /  DBili  x   /  AST  12  /  ALT  10  /  AlkPhos  48  01-21    PT/INR - ( 2020 22:50 )   PT: 12.4 SEC;   INR: 1.11          PTT - ( 2020 22:50 )  PTT:31.6 SEC  Urinalysis Basic - ( 2020 17:40 )    Color: COLORLESS / Appearance: CLEAR / S.007 / pH: 6.0  Gluc: NEGATIVE / Ketone: NEGATIVE  / Bili: NEGATIVE / Urobili: NORMAL   Blood: NEGATIVE / Protein: NEGATIVE / Nitrite: NEGATIVE   Leuk Esterase: LARGE / RBC: 0-2 / WBC 26-50   Sq Epi: OCC / Non Sq Epi: x / Bacteria: FEW      ABO Interpretation: SHI (20 @ 22:47)

## 2020-01-22 NOTE — PROGRESS NOTE ADULT - ASSESSMENT
Patient is a 24 year old female with no PMH or PSH who presents with concern for possible tip appendicitis, symptoms improving  - NPO  - LR @ 100ml/hr  - OOB/IS  - Lovenox for VTE ppx    To be discussed with Dr. Micky ELY Team Surgery #[id number]

## 2020-01-22 NOTE — DISCHARGE NOTE PROVIDER - HOSPITAL COURSE
Patient is a 24 year old female with no PMH or PSH that is presenting to the ED with 3 days of abdominal pain.  The pain started in the RLQ and has remained there.  It radiates to her right back.  She denies having any nausea, vomiting, fevers, chills, or burning with urination.  She has been having regular bowel movements and still has flatus.  Patient states that she had similar pain about three years ago, but was no diagnosis was determined.  Of note, patient also states that she notices a bulge in her right groin that comes and goes with standing and eating.  However, she currently has no bulge in right groin. CT abdomen/pelvis demonstrated Appendiceal tip is mildly thickened and does not fill with oral contrast material. Suggestion of trace periappendiceal inflammation. Findings concerning for early tip appendicitis. She was monitored with serial abdominal exams. The patient's pain was controlled by IV pain medications and then by PO pain medications. The patient was advanced to a regular diet and tolerated it well. The patient was placed on home medications. At the time of discharge, the patient was hemodynamically stable, was tolerating PO diet, was voiding urine and passing stool, was ambulating, and was comfortable with adequate pain control. The patient was instructed to follow up with Dr. Weeks within 2 weeks after discharge from the hospital. The patient & family felt comfortable with discharge. The patient had no other issues.

## 2020-01-23 LAB
BACTERIA UR CULT: SIGNIFICANT CHANGE UP
SPECIMEN SOURCE: SIGNIFICANT CHANGE UP

## 2020-12-14 ENCOUNTER — EMERGENCY (EMERGENCY)
Facility: HOSPITAL | Age: 25
LOS: 1 days | Discharge: HOME | End: 2020-12-14
Attending: EMERGENCY MEDICINE | Admitting: EMERGENCY MEDICINE
Payer: MEDICAID

## 2020-12-14 VITALS
SYSTOLIC BLOOD PRESSURE: 114 MMHG | HEART RATE: 72 BPM | OXYGEN SATURATION: 100 % | TEMPERATURE: 99 F | RESPIRATION RATE: 18 BRPM | HEIGHT: 61 IN | DIASTOLIC BLOOD PRESSURE: 71 MMHG

## 2020-12-14 LAB
ALBUMIN SERPL ELPH-MCNC: 4.7 G/DL — SIGNIFICANT CHANGE UP (ref 3.3–5)
ALP SERPL-CCNC: 53 U/L — SIGNIFICANT CHANGE UP (ref 40–120)
ALT FLD-CCNC: 10 U/L — SIGNIFICANT CHANGE UP (ref 4–33)
ANION GAP SERPL CALC-SCNC: 13 MMOL/L — SIGNIFICANT CHANGE UP (ref 7–14)
APPEARANCE UR: CLEAR — SIGNIFICANT CHANGE UP
APTT BLD: 33.1 SEC — SIGNIFICANT CHANGE UP (ref 27–36.3)
AST SERPL-CCNC: 12 U/L — SIGNIFICANT CHANGE UP (ref 4–32)
BACTERIA # UR AUTO: NEGATIVE — SIGNIFICANT CHANGE UP
BASOPHILS # BLD AUTO: 0.03 K/UL — SIGNIFICANT CHANGE UP (ref 0–0.2)
BASOPHILS NFR BLD AUTO: 0.4 % — SIGNIFICANT CHANGE UP (ref 0–2)
BILIRUB SERPL-MCNC: 0.2 MG/DL — SIGNIFICANT CHANGE UP (ref 0.2–1.2)
BILIRUB UR-MCNC: NEGATIVE — SIGNIFICANT CHANGE UP
BLD GP AB SCN SERPL QL: NEGATIVE — SIGNIFICANT CHANGE UP
BUN SERPL-MCNC: 13 MG/DL — SIGNIFICANT CHANGE UP (ref 7–23)
CALCIUM SERPL-MCNC: 9.3 MG/DL — SIGNIFICANT CHANGE UP (ref 8.4–10.5)
CHLORIDE SERPL-SCNC: 105 MMOL/L — SIGNIFICANT CHANGE UP (ref 98–107)
CO2 SERPL-SCNC: 21 MMOL/L — LOW (ref 22–31)
COLOR SPEC: SIGNIFICANT CHANGE UP
CREAT SERPL-MCNC: 0.63 MG/DL — SIGNIFICANT CHANGE UP (ref 0.5–1.3)
DIFF PNL FLD: NEGATIVE — SIGNIFICANT CHANGE UP
EOSINOPHIL # BLD AUTO: 0.18 K/UL — SIGNIFICANT CHANGE UP (ref 0–0.5)
EOSINOPHIL NFR BLD AUTO: 2.5 % — SIGNIFICANT CHANGE UP (ref 0–6)
EPI CELLS # UR: 4 /HPF — SIGNIFICANT CHANGE UP (ref 0–5)
GLUCOSE SERPL-MCNC: 94 MG/DL — SIGNIFICANT CHANGE UP (ref 70–99)
GLUCOSE UR QL: NEGATIVE — SIGNIFICANT CHANGE UP
HCT VFR BLD CALC: 39.1 % — SIGNIFICANT CHANGE UP (ref 34.5–45)
HGB BLD-MCNC: 13.3 G/DL — SIGNIFICANT CHANGE UP (ref 11.5–15.5)
HYALINE CASTS # UR AUTO: 1 /LPF — SIGNIFICANT CHANGE UP (ref 0–7)
IANC: 4.54 K/UL — SIGNIFICANT CHANGE UP (ref 1.5–8.5)
IMM GRANULOCYTES NFR BLD AUTO: 0.4 % — SIGNIFICANT CHANGE UP (ref 0–1.5)
INR BLD: 1.18 RATIO — HIGH (ref 0.88–1.17)
KETONES UR-MCNC: ABNORMAL
LEUKOCYTE ESTERASE UR-ACNC: ABNORMAL
LIDOCAIN IGE QN: 44 U/L — SIGNIFICANT CHANGE UP (ref 7–60)
LYMPHOCYTES # BLD AUTO: 2.05 K/UL — SIGNIFICANT CHANGE UP (ref 1–3.3)
LYMPHOCYTES # BLD AUTO: 28 % — SIGNIFICANT CHANGE UP (ref 13–44)
MCHC RBC-ENTMCNC: 31.6 PG — SIGNIFICANT CHANGE UP (ref 27–34)
MCHC RBC-ENTMCNC: 34 GM/DL — SIGNIFICANT CHANGE UP (ref 32–36)
MCV RBC AUTO: 92.9 FL — SIGNIFICANT CHANGE UP (ref 80–100)
MONOCYTES # BLD AUTO: 0.49 K/UL — SIGNIFICANT CHANGE UP (ref 0–0.9)
MONOCYTES NFR BLD AUTO: 6.7 % — SIGNIFICANT CHANGE UP (ref 2–14)
NEUTROPHILS # BLD AUTO: 4.54 K/UL — SIGNIFICANT CHANGE UP (ref 1.8–7.4)
NEUTROPHILS NFR BLD AUTO: 62 % — SIGNIFICANT CHANGE UP (ref 43–77)
NITRITE UR-MCNC: NEGATIVE — SIGNIFICANT CHANGE UP
NRBC # BLD: 0 /100 WBCS — SIGNIFICANT CHANGE UP
NRBC # FLD: 0 K/UL — SIGNIFICANT CHANGE UP
PH UR: 7.5 — SIGNIFICANT CHANGE UP (ref 5–8)
PLATELET # BLD AUTO: 344 K/UL — SIGNIFICANT CHANGE UP (ref 150–400)
POTASSIUM SERPL-MCNC: 3.7 MMOL/L — SIGNIFICANT CHANGE UP (ref 3.5–5.3)
POTASSIUM SERPL-SCNC: 3.7 MMOL/L — SIGNIFICANT CHANGE UP (ref 3.5–5.3)
PROT SERPL-MCNC: 7.4 G/DL — SIGNIFICANT CHANGE UP (ref 6–8.3)
PROT UR-MCNC: NEGATIVE — SIGNIFICANT CHANGE UP
PROTHROM AB SERPL-ACNC: 13.5 SEC — HIGH (ref 9.8–13.1)
RBC # BLD: 4.21 M/UL — SIGNIFICANT CHANGE UP (ref 3.8–5.2)
RBC # FLD: 12.9 % — SIGNIFICANT CHANGE UP (ref 10.3–14.5)
RBC CASTS # UR COMP ASSIST: 4 /HPF — SIGNIFICANT CHANGE UP (ref 0–4)
RH IG SCN BLD-IMP: POSITIVE — SIGNIFICANT CHANGE UP
SODIUM SERPL-SCNC: 139 MMOL/L — SIGNIFICANT CHANGE UP (ref 135–145)
SP GR SPEC: 1.02 — SIGNIFICANT CHANGE UP (ref 1.01–1.02)
UROBILINOGEN FLD QL: SIGNIFICANT CHANGE UP
WBC # BLD: 7.32 K/UL — SIGNIFICANT CHANGE UP (ref 3.8–10.5)
WBC # FLD AUTO: 7.32 K/UL — SIGNIFICANT CHANGE UP (ref 3.8–10.5)
WBC UR QL: 10 /HPF — HIGH (ref 0–5)

## 2020-12-14 PROCEDURE — 99284 EMERGENCY DEPT VISIT MOD MDM: CPT

## 2020-12-14 PROCEDURE — 76705 ECHO EXAM OF ABDOMEN: CPT | Mod: 26

## 2020-12-14 RX ORDER — FAMOTIDINE 10 MG/ML
1 INJECTION INTRAVENOUS
Qty: 7 | Refills: 0
Start: 2020-12-14 | End: 2020-12-20

## 2020-12-14 RX ORDER — CEPHALEXIN 500 MG
1 CAPSULE ORAL
Qty: 14 | Refills: 0
Start: 2020-12-14 | End: 2020-12-20

## 2020-12-14 NOTE — ED PROVIDER NOTE - OBJECTIVE STATEMENT
26 y/o female no PMH presents to ER c/o right sided abdominal pain x 2 days. Pt. states for the past 2 days she has been experiencing worsening initally epigastric/genarlized abdominal  pain which has since migrated to the right side and now c/o right sided upper and lower abd pain (lower >upper). Pt. states mild nausea but not vomiting and admits to decreased appetite. Denies fever chills weakness dizziness.

## 2020-12-14 NOTE — ED ADULT NURSE NOTE - OBJECTIVE STATEMENT
Pt is a 25yr old female, A&OX4 and ambulatory, no PMH, complaining of intermittent RUQ pain x3 days. Pt reports taking Gas-X with relief for only an hour. Abd. soft, nondistended, and tender to the RUQ. LMP 11/12. Resp. even and unlabored. Denies CP, SOB, HA, dizziness, N/V, fever/chills, cough, and urinary symptoms. VS as noted. 18g IV placed to the R AC. Labs sent. NAD. Will continue to monitor.

## 2020-12-14 NOTE — ED PROVIDER NOTE - PROGRESS NOTE DETAILS
BELEN Mcintyre - patient reassesed - still c/o bloating t oright side of abdomen intermittently. US gallbladder negative. UA +  patient does admit to some dark urine recently as well. Abdominal exam unchanged - no tenderness rebound or guarding to RLQ - no need for further imaging at this time. Pt. educated on strict return to Er precautions if pain persist or worsens. Stressed PMD and GI follow up as well. Stable for DC at this time.

## 2020-12-14 NOTE — ED PROVIDER NOTE - ATTENDING CONTRIBUTION TO CARE
I have seen and examined the patient on the patient´s visit date. I have reviewed the note written by Julito Mcintyre [facility], on that visit day. I have supervised and participated as necessary in the performance of procedures indicated for patient management and was available at all phases of the patient´s visit when needed. We discussed the history, physical exam findings, management plan, and  medical decision making. I have made my additions, exceptions, and revisions within the chart and I agree with H and P as documented in its entirety. The data and my interpretation of any data collected from labs, interventions and imaging appear below as well as my independent medical decision making and considerations    The patient is a 25y Female who has no pertinent past medical history PTED with bloating and RUQT as described  Vital Signs Last 24 Hrs  T(F): 99.1 HR: 72 BP: 114/71 RR: 18 SpO2: 100% (14 Dec 2020 19:54)   PE: as described; my additions and exceptions are noted in the chart  DATA:  EKG: See above;  LAB:                        13.3   7.32  )-----------( 344      ( 14 Dec 2020 21:45 )             39.1   Mean Cell Volume: 92.9 fL (20 @ 21:45)  Auto Neutrophil %: 62.0 % (20 @ 21:45)  Auto Eosinophil %: 2.5 % (20 @ 21:45)  PT/INR - ( 14 Dec 2020 21:45 )   PT: 13.5 sec;   INR: 1.18 ratio         PTT - ( 14 Dec 2020 21:45 )  PTT:33.1 yzo25-15    139  |  105  |  13  ----------------------------<  94  3.7   |  21<L>  |  0.63    Ca    9.3      14 Dec 2020 21:45    TPro  7.4  /  Alb  4.7  /  TBili  0.2  /  DBili  x   /  AST  12  /  ALT  10  /  AlkPhos  53  12-  Lipase, Serum: 44 U/L (20 @ 21:45)     Urinalysis Basic - ( 14 Dec 2020 21:45 )  Color: Light Yellow / Appearance: Clear / S.019 / pH: x  Gluc: x / Ketone: Trace  / Bili: Negative / Urobili: <2 mg/dL   Blood: x / Protein: Negative / Nitrite: Negative   Leuk Esterase: Large / RBC: 4 /HPF / WBC 10 /HPF   Sq Epi: x / Non Sq Epi: 4 /HPF / Bacteria: Negative       IMPRESSION/RISK:  Dx=RUQT/epi pain ddx including hepatobiliary vs    Plan  iv  symptomatic tx  d/c with same and followup  RTED PRN

## 2020-12-14 NOTE — ED PROVIDER NOTE - CLINICAL SUMMARY MEDICAL DECISION MAKING FREE TEXT BOX
24 y/o female co right sided abdominal pain x 2 days  -possible cholecysitis/lithiasis based on PE  -labs   -us ruq  -pain control reassess

## 2020-12-14 NOTE — ED PROVIDER NOTE - PATIENT PORTAL LINK FT
You can access the FollowMyHealth Patient Portal offered by [facility] by registering at the following website: [email or web address]. By joining [facility]’s FollowMyHealth portal, you will also be able to view your health information using other applications (apps) compatible with our system. You can access the FollowMyHealth Patient Portal offered by [facility] by registering at the following website: [email or web address]. By joining [facility]’s FollowMyHealth portal, you will also be able to view your health information using other applications (apps) compatible with our system.

## 2021-12-09 NOTE — ED ADULT TRIAGE NOTE - HEART RATE (BEATS/MIN)
[facility]  Infectious Disease  Consult Note    Patient Name: Shauna Queen  MRN: [id number]  Admission Date: 11/30/2021  Hospital Length of Stay: 9 days  Attending Physician: Antonio Bernal MD  Primary Care Provider: Jorge Dennis MD     Isolation Status: No active isolations    Patient information was obtained from patient and ER records.      Consults  Assessment/Plan:     * Necrotizing fasciitis  36F with h/o morbid obesity (BMI 85), DM, HTN, DANG, HLD admitted 11/24 with R thigh pain, erythema, tenderness and found to be in DKA. Imaging reportedly couldn't be obtained 2/2 size. Noted to have necrotizing soft tissue infection of R upper leg, now s/p  I&D with surgery on 11/30, 12/2, 12/4, 12/5. Surgical Cultures: 11/30 with - amp sensitive e.faecalis. cultures 12/4 with enteroocccus and enterobacter. BCX- NGTD. Path was sent 12/4 and pending. Has been on vancomycin and zosyn. Ampho added 12/4 given surgery's reported concern for mucor. ID consulted for abx recs    Spoke with pathology- saw microabscess formation, acute inflammation and subQ tissue fibrosis. No hyphal elements seen on H&E stain. PAS and GMS stains were done and Neither shows any fungi.     Recommendations:   - appreciate surgery's help getting source control of infection.   - continue zosyn; est duration 2 weeks from last washout (estimated end date: 12/18/21)  - stop amphotericin  - need weight loss and better control of DM  - wound care as per primary team  - please notify ID with any new growth in cultures          Thank you for your consult. I will sign off. Please contact us if you have any additional questions.    Nalini Colon MD  Infectious Disease  [facility]    Subjective:     Principal Problem: Necrotizing fasciitis    HPI:   36F with h/o morbid obesity (BMI 85), DM, HTN, DANG, HLD admitted to OSH on 11/24 with R thigh pain, erythema, tenderness and found to be in DKA. Imaging reportedly couldn't be  "obtained 2/2 size. Surgery performed I&D and purulent material found and was taken to OR 11/29 for superficial debridement. Then was transferred to [facility] for a "facility better able to perform procedures on a patient with her BMI".      Patient is awake and alert in ICU. Reports her leg wound "popped up" a day before she arrived at OSH. Says that it started as a boil on her thigh and then it "busted" and she had been applying warm compresses and peroxide. Reports she has been ambulatory prior to arrival, but  says she feels like she is going to fall when she tries to walk here. Denies any water or environmental exposures to wound. Denies pet or animal contacts    Has undergone several I&D with surgery on 11/30, 12/2, 12/4, 12/5 for Necrotizing soft tissue infection. Per op notes they saw a "significant amount of foul smelling, necrotic skin and fat"  and "purulent drainage was found tracking along fascial planes"      Surgical Cultures: 11/30 with - amp sensitive e.faecalis. cultures 12/4 with enteroocccus and enterobacter  BCX- NGTD    Path was sent 12/4 and pending    Has been on vancomycin and zosyn. Ampho added 12/4 for concern for mucor     ID consulted for abx recs    Component 2 d ago   Aerobic Bacterial Culture  Abnormal   ENTEROBACTER CLOACAE   Few   P      Aerobic Bacterial Culture  Abnormal   ENTEROCOCCUS SPECIES   Moderate   Identification and susceptibility pending   P      Resulting Agency [facility]          Susceptibility     Enterobacter cloacae     CULTURE, AEROBIC  (SPECIFY SOURCE) (Preliminary)     Cefepime <=2 mcg/mL Sensitive     Ceftriaxone <=1 mcg/mL Sensitive     Ciprofloxacin <=1 mcg/mL Sensitive     Ertapenem <=0.5 mcg/mL Sensitive     Gentamicin <=4 mcg/mL Sensitive     Levofloxacin <=2 mcg/mL Sensitive     Meropenem <=1 mcg/mL Sensitive     Minocycline <=4 mcg/mL Sensitive     Piperacillin/Tazo <=16 mcg/mL Sensitive     Tetracycline <=4 mcg/mL Sensitive     Tobramycin <=4 mcg/mL Sensitive     " Trimeth/Sulfa >2/38 mcg/mL Resistant               Specimen Information: Leg, Right; Abscess         0 Result Notes         (important suggestion)  Newer results are available. Click to view them now.     Component 6 d ago   Aerobic Bacterial Culture  Abnormal   ENTEROCOCCUS FAECALIS   Many     Resulting Agency [facility]          Susceptibility     Enterococcus faecalis     CULTURE, AEROBIC  (SPECIFY SOURCE)     Ampicillin <=2 mcg/mL Sensitive     Gentamicin Synergy Screen <=500 mcg/mL Sensitive     Tetracycline >8 mcg/mL Resistant     Vancomycin 2 mcg/mL Sensitive                 Interval history: NAEO. Stepped down to floor status. Wound vac on R thigh wound. Denies complaints    Review of Systems   Constitutional: Negative for fever.   HENT: Negative.    Eyes: Negative.    Respiratory: Negative.    Cardiovascular: Negative.    Gastrointestinal: Negative for nausea and vomiting.   Endocrine: Negative.    Genitourinary: Negative.    Musculoskeletal: Negative.    Neurological: Negative.    Hematological: Negative.    Psychiatric/Behavioral: Negative.      Objective:     Vital Signs (Most Recent):  Temp: 98.5 °F (36.9 °C) (12/09/21 1100)  Pulse: 105 (12/09/21 1100)  Resp: 18 (12/09/21 1121)  BP: (!) 116/58 (12/09/21 1100)  SpO2: 100 % (12/09/21 1100) Vital Signs (24h Range):  Temp:  [98 °F (36.7 °C)-99.1 °F (37.3 °C)] 98.5 °F (36.9 °C)  Pulse:  [102-121] 105  Resp:  [17-56] 18  SpO2:  [99 %-100 %] 100 %  BP: ()/(47-88) 116/58     Weight: (!) 224 kg (493 lb 13.3 oz)  Body mass index is 84.72 kg/m².    Intake/Output Summary (Last 24 hours) at 12/9/2021 1433  Last data filed at 12/9/2021 1200  Gross per 24 hour   Intake 4459.51 ml   Output 4650 ml   Net -190.49 ml      Physical Exam  Vitals and nursing note reviewed.   Constitutional:       General: She is not in acute distress.     Appearance: Normal appearance. She is obese.   HENT:      Head: Normocephalic and atraumatic.   Cardiovascular:      Rate and Rhythm:  Normal rate and regular rhythm.   Pulmonary:      Effort: Pulmonary effort is normal. No respiratory distress.      Breath sounds: No wheezing.   Musculoskeletal:         General: No swelling. Normal range of motion.      Cervical back: Normal range of motion and neck supple.   Skin:     General: Skin is warm and dry.      Comments: Right upper thigh incision with packing. Only adipose visible on bedside exam. Some scant drainage   Neurological:      General: No focal deficit present.      Mental Status: She is alert and oriented to person, place, and time.      Motor: No weakness.   Psychiatric:         Mood and Affect: Mood normal.         Behavior: Behavior normal.         Thought Content: Thought content normal.         Significant Labs:   All pertinent labs within the past 24 hours have been reviewed.  BMP:   Recent Labs   Lab 12/08/21 0358 12/08/21 0358 12/09/21 0457   *  142*   < > 194*     138   < > 137   K 4.1  4.1   < > 3.3*     104   < > 105   CO2 27  27   < > 26   BUN 8  8   < > 7   CREATININE 0.9  0.9   < > 0.8   CALCIUM 7.1*  7.1*   < > 7.2*   MG 2.0  --   --     < > = values in this interval not displayed.     CBC:   Recent Labs   Lab 12/08/21 0358 12/09/21 0457   WBC 26.59* 19.70*   HGB 7.3* 8.2*   HCT 22.2* 25.8*    234                    60

## 2022-10-16 NOTE — DISCHARGE NOTE PROVIDER - CARE PROVIDER_API CALL
amoxicillin 500 mg oral tablet: 1 tab(s) orally every 8 hours x 7 days  losartan-hydrochlorothiazide 100 mg-12.5 mg oral tablet: 1 tab(s) orally once a day  
[facility])  Surgery  [address], [address]  [place] [address]  Phone: [phone]  Fax: [phone]  Follow Up Time:

## 2023-03-16 NOTE — ED PROVIDER NOTE - RESPIRATORY, MLM
L1 burst fracture with mild superior retropulsion into canal     I spoke with the patient  I explained that this fracture is unstable, meaning that even normal (physiologic) movements and activities could lead to severe neurologic deficit for him  Surgery is the only way to stabilize the spine  Medications, physical therapy, chiropractic care, injections, etc would not stabilize his spine  I plan for T11-L2 pedicle screw stabilization  Other levels may be necessary  I may try to place a screw in the less fractured side of the L1 level  I may also consider laminotomy for open reduction of retropulsed fragments if necessary  I discussed the nature of the procedure and reasonable expectations with the patient and a second time with his wife  The risks benefits and alternatives were explained to the patient, including but not limited to: general anesthesia, bleeding, infection, stroke, coma, death, CSF leak, nerve damage, brain damage, spinal cord damage, failure to improve, failure of hardware, failure of fusion, neurologic deficit including paralysis, need for reoperation  All questions were answered  No guarantees were given  Breath sounds clear and equal bilaterally.

## 2024-05-02 ENCOUNTER — EMERGENCY (EMERGENCY)
Facility: HOSPITAL | Age: 29
LOS: 1 days | Discharge: HOME | End: 2024-05-02
Attending: EMERGENCY MEDICINE | Admitting: EMERGENCY MEDICINE
Payer: MEDICAID

## 2024-05-02 VITALS
DIASTOLIC BLOOD PRESSURE: 73 MMHG | RESPIRATION RATE: 16 BRPM | TEMPERATURE: 98 F | OXYGEN SATURATION: 99 % | HEART RATE: 68 BPM | SYSTOLIC BLOOD PRESSURE: 112 MMHG

## 2024-05-02 LAB
ALBUMIN SERPL ELPH-MCNC: 4.8 G/DL — SIGNIFICANT CHANGE UP (ref 3.3–5)
ALP SERPL-CCNC: 62 U/L — SIGNIFICANT CHANGE UP (ref 40–120)
ALT FLD-CCNC: 17 U/L — SIGNIFICANT CHANGE UP (ref 4–33)
ANION GAP SERPL CALC-SCNC: 13 MMOL/L — SIGNIFICANT CHANGE UP (ref 7–14)
APPEARANCE UR: CLEAR — SIGNIFICANT CHANGE UP
AST SERPL-CCNC: 18 U/L — SIGNIFICANT CHANGE UP (ref 4–32)
BASOPHILS # BLD AUTO: 0.03 K/UL — SIGNIFICANT CHANGE UP (ref 0–0.2)
BASOPHILS NFR BLD AUTO: 0.4 % — SIGNIFICANT CHANGE UP (ref 0–2)
BILIRUB SERPL-MCNC: 0.5 MG/DL — SIGNIFICANT CHANGE UP (ref 0.2–1.2)
BILIRUB UR-MCNC: NEGATIVE — SIGNIFICANT CHANGE UP
BUN SERPL-MCNC: 7 MG/DL — SIGNIFICANT CHANGE UP (ref 7–23)
CALCIUM SERPL-MCNC: 9.6 MG/DL — SIGNIFICANT CHANGE UP (ref 8.4–10.5)
CHLORIDE SERPL-SCNC: 104 MMOL/L — SIGNIFICANT CHANGE UP (ref 98–107)
CO2 SERPL-SCNC: 23 MMOL/L — SIGNIFICANT CHANGE UP (ref 22–31)
COLOR SPEC: YELLOW — SIGNIFICANT CHANGE UP
CREAT SERPL-MCNC: 0.52 MG/DL — SIGNIFICANT CHANGE UP (ref 0.5–1.3)
DIFF PNL FLD: NEGATIVE — SIGNIFICANT CHANGE UP
EGFR: 131 ML/MIN/1.73M2 — SIGNIFICANT CHANGE UP
EOSINOPHIL # BLD AUTO: 0.13 K/UL — SIGNIFICANT CHANGE UP (ref 0–0.5)
EOSINOPHIL NFR BLD AUTO: 1.6 % — SIGNIFICANT CHANGE UP (ref 0–6)
GAS PNL BLDV: SIGNIFICANT CHANGE UP
GLUCOSE SERPL-MCNC: 88 MG/DL — SIGNIFICANT CHANGE UP (ref 70–99)
GLUCOSE UR QL: NEGATIVE MG/DL — SIGNIFICANT CHANGE UP
HCG SERPL-ACNC: <1 MIU/ML — SIGNIFICANT CHANGE UP
HCT VFR BLD CALC: 39.5 % — SIGNIFICANT CHANGE UP (ref 34.5–45)
HGB BLD-MCNC: 13.5 G/DL — SIGNIFICANT CHANGE UP (ref 11.5–15.5)
IANC: 5.41 K/UL — SIGNIFICANT CHANGE UP (ref 1.8–7.4)
IMM GRANULOCYTES NFR BLD AUTO: 0.4 % — SIGNIFICANT CHANGE UP (ref 0–0.9)
KETONES UR-MCNC: NEGATIVE MG/DL — SIGNIFICANT CHANGE UP
LEUKOCYTE ESTERASE UR-ACNC: NEGATIVE — SIGNIFICANT CHANGE UP
LIDOCAIN IGE QN: 37 U/L — SIGNIFICANT CHANGE UP (ref 7–60)
LYMPHOCYTES # BLD AUTO: 1.83 K/UL — SIGNIFICANT CHANGE UP (ref 1–3.3)
LYMPHOCYTES # BLD AUTO: 23.2 % — SIGNIFICANT CHANGE UP (ref 13–44)
MCHC RBC-ENTMCNC: 31.7 PG — SIGNIFICANT CHANGE UP (ref 27–34)
MCHC RBC-ENTMCNC: 34.2 GM/DL — SIGNIFICANT CHANGE UP (ref 32–36)
MCV RBC AUTO: 92.7 FL — SIGNIFICANT CHANGE UP (ref 80–100)
MONOCYTES # BLD AUTO: 0.47 K/UL — SIGNIFICANT CHANGE UP (ref 0–0.9)
MONOCYTES NFR BLD AUTO: 5.9 % — SIGNIFICANT CHANGE UP (ref 2–14)
NEUTROPHILS # BLD AUTO: 5.41 K/UL — SIGNIFICANT CHANGE UP (ref 1.8–7.4)
NEUTROPHILS NFR BLD AUTO: 68.5 % — SIGNIFICANT CHANGE UP (ref 43–77)
NITRITE UR-MCNC: NEGATIVE — SIGNIFICANT CHANGE UP
NRBC # BLD: 0 /100 WBCS — SIGNIFICANT CHANGE UP (ref 0–0)
NRBC # FLD: 0 K/UL — SIGNIFICANT CHANGE UP (ref 0–0)
PH UR: 7.5 — SIGNIFICANT CHANGE UP (ref 5–8)
PLATELET # BLD AUTO: 408 K/UL — HIGH (ref 150–400)
POTASSIUM SERPL-MCNC: 5 MMOL/L — SIGNIFICANT CHANGE UP (ref 3.5–5.3)
POTASSIUM SERPL-SCNC: 5 MMOL/L — SIGNIFICANT CHANGE UP (ref 3.5–5.3)
PROT SERPL-MCNC: 7.3 G/DL — SIGNIFICANT CHANGE UP (ref 6–8.3)
PROT UR-MCNC: NEGATIVE MG/DL — SIGNIFICANT CHANGE UP
RBC # BLD: 4.26 M/UL — SIGNIFICANT CHANGE UP (ref 3.8–5.2)
RBC # FLD: 12.9 % — SIGNIFICANT CHANGE UP (ref 10.3–14.5)
SODIUM SERPL-SCNC: 140 MMOL/L — SIGNIFICANT CHANGE UP (ref 135–145)
SP GR SPEC: 1.01 — SIGNIFICANT CHANGE UP (ref 1–1.03)
UROBILINOGEN FLD QL: 0.2 MG/DL — SIGNIFICANT CHANGE UP (ref 0.2–1)
WBC # BLD: 7.9 K/UL — SIGNIFICANT CHANGE UP (ref 3.8–10.5)
WBC # FLD AUTO: 7.9 K/UL — SIGNIFICANT CHANGE UP (ref 3.8–10.5)

## 2024-05-02 PROCEDURE — 99285 EMERGENCY DEPT VISIT HI MDM: CPT

## 2024-05-02 PROCEDURE — 76856 US EXAM PELVIC COMPLETE: CPT | Mod: 26

## 2024-05-02 RX ORDER — SODIUM CHLORIDE 9 MG/ML
1000 INJECTION INTRAMUSCULAR; INTRAVENOUS; SUBCUTANEOUS ONCE
Refills: 0 | Status: COMPLETED | OUTPATIENT
Start: 2024-05-02 | End: 2024-05-02

## 2024-05-02 RX ADMIN — SODIUM CHLORIDE 1000 MILLILITER(S): 9 INJECTION INTRAMUSCULAR; INTRAVENOUS; SUBCUTANEOUS at 19:32

## 2024-05-02 NOTE — ED PROVIDER NOTE - ATTENDING APP SHARED VISIT CONTRIBUTION OF CARE
28 year old fm with pmhx appendicitis (treated with abx no sx) presents to the ED with 3 days of RLQ pain. patient reports feels similar to last appendicitis   LMP 1 month ago patient states normal cycles.  Denies vaginal bleeding.  No dysuria, no fever, no visible hematuria, no nausea, no vomiting.    Patient no acute distress, points to right lower quadrant area as location of tenderness.  No guarding, no rebound, no rigidity.    US reported No acute findings. 1.5 cm left adnexal cyst, favored to be a paraovarian cyst.      12:39 AM signout to relieving ED attending follow-up CT.

## 2024-05-02 NOTE — ED ADULT TRIAGE NOTE - CHIEF COMPLAINT QUOTE
pt c/o right sided abd pain with nausea x 3 days. pt denies vomiting/diarrhea/fever/chills. +tenderness noted to RUQ and RLQ.

## 2024-05-02 NOTE — ED PROVIDER NOTE - NSFOLLOWUPINSTRUCTIONS_ED_ALL_ED_FT
There are no signs of emergency conditions requiring admission to the hospital on today's workup.  Based on the evaluation, a presumptive diagnosis was made, however, further evaluation may be required by your primary care physician or a specialist for a more definitive diagnosis.  Therefore, please follow-up as directed or return to the Emergency Department if your symptoms change or worsen.    We recommend that you:  1. See your primary care physician within the next 72 hours for follow up.  Bring a copy of your discharge paperwork (including any test results) to your doctor.  2. Maintain hydration and have healthy meals as tolerated.  3.  4.      *** Return immediately if you have worsening symptoms, chest pain, Shortness of Breath, abdominal pain, Nausea/Vomiting/Diarrhea, dizziness, weakness, confusion, severe headache, vision changes, urinary symptoms, syncope, falls, trauma, discharge, bleeding, fevers, or any other new/concerning symptoms. *** There are no signs of emergency conditions requiring admission to the hospital on today's workup.  Based on the evaluation, a presumptive diagnosis was made, however, further evaluation may be required by your primary care physician or a specialist for a more definitive diagnosis.  Therefore, please follow-up as directed or return to the Emergency Department if your symptoms change or worsen.    We recommend that you:  1. See your primary care physician within the next 72 hours for follow up.  Bring a copy of your discharge paperwork (including any test results) to your doctor.  2. Maintain hydration and have healthy meals as tolerated.  3. Use ibuprofen (Motrin or Advil) 400mg up to 3x per day with food.  4. Stop taking your new vitamin medication for now.      *** Return immediately if you have worsening symptoms, chest pain, Shortness of Breath, abdominal pain, Nausea/Vomiting/Diarrhea, dizziness, weakness, confusion, severe headache, vision changes, urinary symptoms, syncope, falls, trauma, discharge, bleeding, fevers, or any other new/concerning symptoms. ***

## 2024-05-02 NOTE — ED PROVIDER NOTE - NSICDXNOPASTSURGICALHX_GEN_ALL_ED
Daily Note     Today's date: 3/1/2021  Patient name: Saritha Moran  : 2001  MRN: [id number]  Referring provider: Manuel Amezcua PT  Dx:   Encounter Diagnosis     ICD-10-CM    1  Left shoulder pain, unspecified chronicity  M25 [id number]                   Subjective:  Patient states that she is doing well  Objective: See treatment diary below    Assessment:  Patient presents without pain today  She demonstrates good progress c/ exercises without pain  Tolerated treatment well  Patient would benefit from continued PT    Plan: Continue per plan of care        Precautions:  None    Manuals 2/9 2/12 2/15 2/18 2/25 3/1       STM/MFR posterior cuff AZ AZ AZ AZ AZ AZ       Posterior cuff str AZ AZ AZ AZ AZ AZ       LS release & stretch  AZ AZ AZ AZ AZ       Thoracic UPAs L  AZ AZ AZ AZ AZ       1st rib mob L   AZ AZ AZ        Neuro Re-Ed             Scap 4 20x5s 20x5s red 30x5s red 3x15 5s red 3x10 5s green 3x15 5s green                                 Ther Ex             Prone ret  2x10 2s 3x10 2s 3x12 2s 3x15 2s 3x10 2s 2#       Prone T's palm down/ thumb up  2x10 2s 3x10 2s 3x12 2s 3x15 2s 3x15 2s       Prone Y's  2x10  3x10 3x12 3x15 3x15       Prone ext     3x15 2# 3x15 2#       alph  1x ball 3x ball 3x ball 3x ball 3x ball       Wall slides  3x10 3x10 1# 3x12 1# 3x10 2# 3x15 2#       PB W'S      3x10 5s                    Ther Activity                                       Gait Training                                       Modalities              10' <-- Click to add NO significant Past Surgical History

## 2024-05-02 NOTE — ED PROVIDER NOTE - PATIENT PORTAL LINK FT
You can access the FollowMyHealth Patient Portal offered by [facility] by registering at the following website: [email or web address]. By joining [facility]’s FollowMyHealth portal, you will also be able to view your health information using other applications (apps) compatible with our system.

## 2024-05-02 NOTE — ED PROVIDER NOTE - PHYSICAL EXAMINATION
On Physical Exam:  General: well appearing, in NAD, speaking clearly in full sentences and without difficulty; cooperative with exam  HEENT: PERRL, MMM  Neck: no neck tenderness, no nuchal rigidity  Cardiac: normal s1, s2; RRR; no MGR  Lungs: CTABL  Abdomen: RLQ TTP, otherwise soft nontender/nondistended  : Pelvic REFUSED, no bladder tenderness or distension  Skin: warm, intact, no rash  Extremities: no peripheral edema, no gross deformities  Neuro: no gross neurologic deficits

## 2024-05-02 NOTE — ED PROVIDER NOTE - OBJECTIVE STATEMENT
28 year old fm with pmhx appendicitis (treated with abx no sx) presents to the ED with 3 days of RLQ pain. patient reports feels similar to last appendicitis episode 4 years ago. patient reports at that time refused sx and took abx with relief of symptoms. patient reports tolerating PO and is having bowel movements normally LBM this AM. patient reports not taking anything for symptoms prior to ED visit. patient denies vaginal bleeding vaginal discharge.  chest pain, Shortness of Breath, Nausea/Vomiting/Diarrhea, dizziness, weakness, confusion, vision changes, urinary symptoms, syncope, falls, trauma, discharge, bleeding, fevers, chills. LMP 3 weeks ago.

## 2024-05-02 NOTE — ED PROVIDER NOTE - CLINICAL SUMMARY MEDICAL DECISION MAKING FREE TEXT BOX
28 year old fm with pmhx appendicitis (treated with abx no sx) presents to the ED with 3 days of RLQ pain.  concern for appendicitis vs gastroenteritis  labs, CT, pain management, IVF, US, reassess  DW attending MD Prednisone Counseling:  I discussed with the patient the risks of prolonged use of prednisone including but not limited to weight gain, insomnia, osteoporosis, mood changes, diabetes, susceptibility to infection, glaucoma and high blood pressure.  In cases where prednisone use is prolonged, patients should be monitored with blood pressure checks, serum glucose levels and an eye exam.  Additionally, the patient may need to be placed on GI prophylaxis, PCP prophylaxis, and calcium and vitamin D supplementation and/or a bisphosphonate.  The patient verbalized understanding of the proper use and the possible adverse effects of prednisone.  All of the patient's questions and concerns were addressed.

## 2024-05-02 NOTE — ED ADULT NURSE NOTE - OBJECTIVE STATEMENT
Patient A&o X4, received in intake , with complaints of abdominal pain. Patient states, "I have abdominal pain and nausea x3 days". Patient complains of right lower quadrant abdominal pain rating 6 out of 10 currently, patient denies taking any medication prior to arrival. No abdominal tenderness noted on palpation. Patient able to speak in clear sentences, respirations equal and unlabored. Lung sounds clear b/l, equal chest rise and fall noted. Denies CP/SOB, fever, chills, vomiting and diarrhea at this time. Skin warm and dry. Provider at bedside for eval, pending further orders.

## 2024-05-03 VITALS
TEMPERATURE: 98 F | HEART RATE: 73 BPM | SYSTOLIC BLOOD PRESSURE: 108 MMHG | OXYGEN SATURATION: 98 % | RESPIRATION RATE: 18 BRPM | DIASTOLIC BLOOD PRESSURE: 72 MMHG

## 2024-05-03 PROCEDURE — 74177 CT ABD & PELVIS W/CONTRAST: CPT | Mod: 26,MC

## 2024-05-03 RX ORDER — KETOROLAC TROMETHAMINE 30 MG/ML
30 SYRINGE (ML) INJECTION ONCE
Refills: 0 | Status: DISCONTINUED | OUTPATIENT
Start: 2024-05-03 | End: 2024-05-03

## 2024-05-03 RX ADMIN — Medication 30 MILLIGRAM(S): at 05:03

## 2024-05-03 NOTE — CONSULT NOTE ADULT - SUBJECTIVE AND OBJECTIVE BOX
SURGERY CONSULT NOTE    HPI: 27 YO F hx of appendicitis treated with abx in  without interval appendectomy admitted to Dr. Weeks p/w with abdominal pain, 3 days, RLQ. Denies nausea, emesis. Able to tolerate PO intake, denies fevers or chills. Last menstrual cycle 3 weeks ago. Surgery consulted for r/o appendicitis. WBC wnl. CT without findings c/w acute appendicitis.       PAST MEDICAL & SURGICAL HISTORY:  No pertinent past medical history      No significant past surgical history          MEDICATIONS  (STANDING):    MEDICATIONS  (PRN):      Allergies    No Known Allergies    Intolerances        SOCIAL HISTORY:    FAMILY HISTORY:  No pertinent family history in first degree relatives        Physical Exam:  General: NAD, resting comfortably  HEENT: NC/AT, EOMI, normal hearing, no oral lesions, no LAD, neck supple  Pulmonary: normal resp effort, CTA-B  Cardiovascular: NSR, no murmurs  Abdominal: soft, ND/NT, upon deep palpation mild discomfort in RLQ    Vital Signs Last 24 Hrs  T(C): 36.8 (02 May 2024 20:45), Max: 36.8 (02 May 2024 20:45)  T(F): 98.2 (02 May 2024 20:45), Max: 98.2 (02 May 2024 20:45)  HR: 69 (02 May 2024 20:45) (68 - 69)  BP: 100/67 (02 May 2024 20:45) (100/67 - 112/73)  BP(mean): --  RR: 18 (02 May 2024 20:45) (16 - 18)  SpO2: 100% (02 May 2024 20:45) (99% - 100%)    Parameters below as of 02 May 2024 20:45  Patient On (Oxygen Delivery Method): room air        I&O's Summary          LABS:                        13.5   7.90  )-----------( 408      ( 02 May 2024 20:39 )             39.5     05-02    140  |  104  |  7   ----------------------------<  88  5.0   |  23  |  0.52    Ca    9.6      02 May 2024 20:39    TPro  7.3  /  Alb  4.8  /  TBili  0.5  /  DBili  x   /  AST  18  /  ALT  17  /  AlkPhos  62  05-02      Urinalysis Basic - ( 02 May 2024 20:48 )    Color: Yellow / Appearance: Clear / S.008 / pH: x  Gluc: x / Ketone: Negative mg/dL  / Bili: Negative / Urobili: 0.2 mg/dL   Blood: x / Protein: Negative mg/dL / Nitrite: Negative   Leuk Esterase: Negative / RBC: x / WBC x   Sq Epi: x / Non Sq Epi: x / Bacteria: x      CAPILLARY BLOOD GLUCOSE        LIVER FUNCTIONS - ( 02 May 2024 20:39 )  Alb: 4.8 g/dL / Pro: 7.3 g/dL / ALK PHOS: 62 U/L / ALT: 17 U/L / AST: 18 U/L / GGT: x             Cultures:      RADIOLOGY & ADDITIONAL STUDIES:  < from: CT Abdomen and Pelvis w/ IV Cont (24 @ 01:40) >  PROCEDURE:  CT of the Abdomen and Pelvis was performed.  Sagittal and coronal reformats were performed.    FINDINGS:  LOWER CHEST: Within normal limits.    LIVER: Within normal limits.  BILE DUCTS: Normal caliber.  GALLBLADDER: Within normal limits.  SPLEEN: Within normal limits.  PANCREAS: Within normal limits.  ADRENALS: Within normal limits.  KIDNEYS/URETERS: Within normal limits. No renal stones. No   hydroureteronephrosis.    BLADDER: Within normal limits.  REPRODUCTIVE ORGANS: Uterus and adnexa within normal limits. Bilateral   ovarian cysts.    BOWEL: No bowel obstruction. Appendix is not clearly visualized. No   pericecal inflammatory changes. No colonic bowel wall thickening.  PERITONEUM: No ascites.  VESSELS: Within normal limits.  RETROPERITONEUM/LYMPH NODES: No lymphadenopathy.  ABDOMINAL WALL: Within normal limits.  BONES: Within normal limits.    IMPRESSION:  Nonvisualized appendix. No secondary signs of acute appendicitis in the   right lower quadrant. If symptoms persist, consider repeat imaging with   enteric contrast for better assessment of the bowel.    No acute findings.    --- End of Report ---    < end of copied text >        Plan:   27 YO F hx of appendicitis treated with abx in  without interval appendectomy p/w with abdominal pain, 3 days, RLQ. Denies nausea, emesis. Able to tolerate PO intake, denies fevers or chills. Surgery consulted for r/o appendicitis. WBC wnl. CT without findings c/w acute appendicitis.     - low suspicion for acute appendicitis at this time given clinical symptoms, CT imaging and lab work  - no acute surgical intervention at this time    d/w Dr. Kebede, surgery attending    B Team, [id number]

## 2025-01-09 NOTE — PATIENT PROFILE ADULT - NSASFUNCLEVELADLTOILET_GEN_A_NUR
----- Message from SEAMUS Zarate sent at 1/9/2025  4:39 PM CST -----  Please let the pt know the labs are normal .  Follow up as recommended    0 = independent

## 2025-01-15 NOTE — ED ADULT TRIAGE NOTE - MODE OF ARRIVAL
Last Appointment:  12/19/2024  Future Appointments   Date Time Provider Department Center   1/16/2025  9:30 AM Chance Mahmood DO [facility]   1/17/2025  1:45 PM Pierce Andrew MD [facility] DEP   2/28/2025  8:40 AM Radha Petty MD [facility] NEURO Neurology -         Walk in

## 2025-02-19 ENCOUNTER — EMERGENCY (EMERGENCY)
Facility: HOSPITAL | Age: 30
LOS: 1 days | Discharge: HOME | End: 2025-02-19
Attending: EMERGENCY MEDICINE | Admitting: EMERGENCY MEDICINE
Payer: MEDICAID

## 2025-02-19 VITALS
TEMPERATURE: 98 F | RESPIRATION RATE: 16 BRPM | SYSTOLIC BLOOD PRESSURE: 130 MMHG | DIASTOLIC BLOOD PRESSURE: 75 MMHG | HEART RATE: 67 BPM | OXYGEN SATURATION: 100 %

## 2025-02-19 LAB
ADD ON TEST-SPECIMEN IN LAB: SIGNIFICANT CHANGE UP
ALBUMIN SERPL ELPH-MCNC: 4.7 G/DL — SIGNIFICANT CHANGE UP (ref 3.3–5)
ALP SERPL-CCNC: 60 U/L — SIGNIFICANT CHANGE UP (ref 40–120)
ALT FLD-CCNC: 10 U/L — SIGNIFICANT CHANGE UP (ref 4–33)
ANION GAP SERPL CALC-SCNC: 14 MMOL/L — SIGNIFICANT CHANGE UP (ref 7–14)
APTT BLD: 34 SEC — SIGNIFICANT CHANGE UP (ref 24.5–35.6)
AST SERPL-CCNC: 14 U/L — SIGNIFICANT CHANGE UP (ref 4–32)
BASOPHILS # BLD AUTO: 0.04 K/UL — SIGNIFICANT CHANGE UP (ref 0–0.2)
BASOPHILS NFR BLD AUTO: 0.6 % — SIGNIFICANT CHANGE UP (ref 0–2)
BILIRUB SERPL-MCNC: 0.5 MG/DL — SIGNIFICANT CHANGE UP (ref 0.2–1.2)
BUN SERPL-MCNC: 11 MG/DL — SIGNIFICANT CHANGE UP (ref 7–23)
CALCIUM SERPL-MCNC: 8.8 MG/DL — SIGNIFICANT CHANGE UP (ref 8.4–10.5)
CHLORIDE SERPL-SCNC: 103 MMOL/L — SIGNIFICANT CHANGE UP (ref 98–107)
CO2 SERPL-SCNC: 20 MMOL/L — LOW (ref 22–31)
CREAT SERPL-MCNC: 0.82 MG/DL — SIGNIFICANT CHANGE UP (ref 0.5–1.3)
EGFR: 100 ML/MIN/1.73M2 — SIGNIFICANT CHANGE UP
EGFR: 100 ML/MIN/1.73M2 — SIGNIFICANT CHANGE UP
EOSINOPHIL # BLD AUTO: 0.07 K/UL — SIGNIFICANT CHANGE UP (ref 0–0.5)
EOSINOPHIL NFR BLD AUTO: 1 % — SIGNIFICANT CHANGE UP (ref 0–6)
GLUCOSE SERPL-MCNC: 112 MG/DL — HIGH (ref 70–99)
HCG SERPL-ACNC: <1 MIU/ML — SIGNIFICANT CHANGE UP
HCT VFR BLD CALC: 39.4 % — SIGNIFICANT CHANGE UP (ref 34.5–45)
HGB BLD-MCNC: 13.9 G/DL — SIGNIFICANT CHANGE UP (ref 11.5–15.5)
IANC: 4.36 K/UL — SIGNIFICANT CHANGE UP (ref 1.8–7.4)
IMM GRANULOCYTES NFR BLD AUTO: 0.3 % — SIGNIFICANT CHANGE UP (ref 0–0.9)
INR BLD: 1.01 RATIO — SIGNIFICANT CHANGE UP (ref 0.85–1.16)
LYMPHOCYTES # BLD AUTO: 1.94 K/UL — SIGNIFICANT CHANGE UP (ref 1–3.3)
LYMPHOCYTES # BLD AUTO: 28 % — SIGNIFICANT CHANGE UP (ref 13–44)
MAGNESIUM SERPL-MCNC: 2.2 MG/DL — SIGNIFICANT CHANGE UP (ref 1.6–2.6)
MCHC RBC-ENTMCNC: 32.1 PG — SIGNIFICANT CHANGE UP (ref 27–34)
MCHC RBC-ENTMCNC: 35.3 G/DL — SIGNIFICANT CHANGE UP (ref 32–36)
MCV RBC AUTO: 91 FL — SIGNIFICANT CHANGE UP (ref 80–100)
MONOCYTES # BLD AUTO: 0.49 K/UL — SIGNIFICANT CHANGE UP (ref 0–0.9)
MONOCYTES NFR BLD AUTO: 7.1 % — SIGNIFICANT CHANGE UP (ref 2–14)
NEUTROPHILS # BLD AUTO: 4.36 K/UL — SIGNIFICANT CHANGE UP (ref 1.8–7.4)
NEUTROPHILS NFR BLD AUTO: 63 % — SIGNIFICANT CHANGE UP (ref 43–77)
NRBC # BLD AUTO: 0 K/UL — SIGNIFICANT CHANGE UP (ref 0–0)
NRBC # FLD: 0 K/UL — SIGNIFICANT CHANGE UP (ref 0–0)
NRBC BLD AUTO-RTO: 0 /100 WBCS — SIGNIFICANT CHANGE UP (ref 0–0)
PHOSPHATE SERPL-MCNC: 2.8 MG/DL — SIGNIFICANT CHANGE UP (ref 2.5–4.5)
PLATELET # BLD AUTO: 349 K/UL — SIGNIFICANT CHANGE UP (ref 150–400)
POTASSIUM SERPL-MCNC: 4 MMOL/L — SIGNIFICANT CHANGE UP (ref 3.5–5.3)
POTASSIUM SERPL-SCNC: 4 MMOL/L — SIGNIFICANT CHANGE UP (ref 3.5–5.3)
PROT SERPL-MCNC: 7.3 G/DL — SIGNIFICANT CHANGE UP (ref 6–8.3)
PROTHROM AB SERPL-ACNC: 12 SEC — SIGNIFICANT CHANGE UP (ref 9.9–13.4)
RBC # BLD: 4.33 M/UL — SIGNIFICANT CHANGE UP (ref 3.8–5.2)
RBC # FLD: 12.6 % — SIGNIFICANT CHANGE UP (ref 10.3–14.5)
SODIUM SERPL-SCNC: 137 MMOL/L — SIGNIFICANT CHANGE UP (ref 135–145)
TROPONIN T, HIGH SENSITIVITY RESULT: <6 NG/L — SIGNIFICANT CHANGE UP
WBC # BLD: 6.92 K/UL — SIGNIFICANT CHANGE UP (ref 3.8–10.5)
WBC # FLD AUTO: 6.92 K/UL — SIGNIFICANT CHANGE UP (ref 3.8–10.5)

## 2025-02-19 PROCEDURE — 70498 CT ANGIOGRAPHY NECK: CPT | Mod: 26

## 2025-02-19 PROCEDURE — 99223 1ST HOSP IP/OBS HIGH 75: CPT

## 2025-02-19 PROCEDURE — 70450 CT HEAD/BRAIN W/O DYE: CPT | Mod: 26,59

## 2025-02-19 PROCEDURE — 93010 ELECTROCARDIOGRAM REPORT: CPT

## 2025-02-19 PROCEDURE — 70496 CT ANGIOGRAPHY HEAD: CPT | Mod: 26,76

## 2025-02-19 RX ORDER — METOCLOPRAMIDE HCL 10 MG
10 TABLET ORAL EVERY 8 HOURS
Refills: 0 | Status: DISCONTINUED | OUTPATIENT
Start: 2025-02-19 | End: 2025-02-23

## 2025-02-19 RX ORDER — METOCLOPRAMIDE HCL 10 MG
10 TABLET ORAL ONCE
Refills: 0 | Status: COMPLETED | OUTPATIENT
Start: 2025-02-19 | End: 2025-02-19

## 2025-02-19 RX ORDER — DIPHENHYDRAMINE HCL 12.5MG/5ML
25 ELIXIR ORAL EVERY 8 HOURS
Refills: 0 | Status: DISCONTINUED | OUTPATIENT
Start: 2025-02-19 | End: 2025-02-23

## 2025-02-19 RX ORDER — ACETAMINOPHEN 500 MG/5ML
1000 LIQUID (ML) ORAL ONCE
Refills: 0 | Status: COMPLETED | OUTPATIENT
Start: 2025-02-19 | End: 2025-02-19

## 2025-02-19 RX ORDER — MAGNESIUM SULFATE 500 MG/ML
2 SYRINGE (ML) INJECTION ONCE
Refills: 0 | Status: COMPLETED | OUTPATIENT
Start: 2025-02-19 | End: 2025-02-19

## 2025-02-19 RX ORDER — KETOROLAC TROMETHAMINE 30 MG/ML
30 INJECTION, SOLUTION INTRAMUSCULAR; INTRAVENOUS EVERY 8 HOURS
Refills: 0 | Status: DISCONTINUED | OUTPATIENT
Start: 2025-02-19 | End: 2025-02-19

## 2025-02-19 RX ORDER — ACETAMINOPHEN 500 MG/5ML
1000 LIQUID (ML) ORAL EVERY 8 HOURS
Refills: 0 | Status: DISCONTINUED | OUTPATIENT
Start: 2025-02-19 | End: 2025-02-23

## 2025-02-19 RX ORDER — KETOROLAC TROMETHAMINE 30 MG/ML
15 INJECTION, SOLUTION INTRAMUSCULAR; INTRAVENOUS ONCE
Refills: 0 | Status: DISCONTINUED | OUTPATIENT
Start: 2025-02-19 | End: 2025-02-19

## 2025-02-19 RX ADMIN — Medication 125 MILLILITER(S): at 19:16

## 2025-02-19 RX ADMIN — KETOROLAC TROMETHAMINE 15 MILLIGRAM(S): 30 INJECTION, SOLUTION INTRAMUSCULAR; INTRAVENOUS at 17:01

## 2025-02-19 RX ADMIN — Medication 400 MILLIGRAM(S): at 15:59

## 2025-02-19 RX ADMIN — Medication 100 GRAM(S): at 17:01

## 2025-02-19 RX ADMIN — Medication 1000 MILLILITER(S): at 15:59

## 2025-02-19 RX ADMIN — Medication 125 MILLILITER(S): at 21:41

## 2025-02-19 RX ADMIN — Medication 10 MILLIGRAM(S): at 15:59

## 2025-02-20 VITALS
DIASTOLIC BLOOD PRESSURE: 67 MMHG | OXYGEN SATURATION: 99 % | HEART RATE: 60 BPM | SYSTOLIC BLOOD PRESSURE: 108 MMHG | TEMPERATURE: 98 F | RESPIRATION RATE: 18 BRPM

## 2025-02-20 PROCEDURE — 99239 HOSP IP/OBS DSCHRG MGMT >30: CPT

## 2025-02-20 PROCEDURE — 70553 MRI BRAIN STEM W/O & W/DYE: CPT | Mod: 26

## 2025-02-20 RX ADMIN — Medication 125 MILLILITER(S): at 08:34

## 2025-04-02 NOTE — ED PROVIDER NOTE - OBJECTIVE STATEMENT
well developed, well nourished , in no acute distress , ambulating without difficulty , normal communication ability
23 F denies PMH P/W abdominal pain: localizes to the epigastrium and right upper quadrant, present x several days.  No clear precipitating factor, intermittent, resolves spontaneously.  No nausea or vomiting.  No fever or chills.  Passing normal stool and flatus: no bloody or melenic stools.  Denies chest pain or SOB.  No vaginal discharge or bleeding.  No dysuria or back pain.  Triage note documents right lower quadrant pain: the patient denies this and firmly states that the pain localizes to the right upper quadrant and epigastrium.

## 2025-05-19 NOTE — ED PROVIDER NOTE - ENMT NEGATIVE STATEMENT, MLM
The patient's vancomycin therapy has been discontinued. Pharmacy will sign off now. Thank you for this consult.    Ears: no ear pain and no hearing problems.Nose: no nasal congestion and no nasal drainage.Mouth/Throat: no dysphagia, no hoarseness and no throat pain.Neck: no lumps, no pain, no stiffness and no swollen glands.

## 2025-07-01 NOTE — ED ADULT TRIAGE NOTE - SOURCE OF INFORMATION
[Time Spent: ___ minutes] : I have spent [unfilled] minutes of time on the encounter which excludes teaching and separately reported services. Patient